# Patient Record
Sex: FEMALE | Race: OTHER
[De-identification: names, ages, dates, MRNs, and addresses within clinical notes are randomized per-mention and may not be internally consistent; named-entity substitution may affect disease eponyms.]

---

## 2018-02-17 NOTE — NUR
ICU RN - REC'D PT. ON DIPRIVAN GTT. AT 60 MCG/KG/MIN. SEDATION VACATION DONE W/CHANGING OF 
BOTTLES. PT. STARTS TO PULL ON BILAT.SOFT WRIST RESTRAINTS & BITING ETT. VENT SETTINGS AT AC 
18,TV-500-40%-PEEP-5. PT.HAS HAD HIGH RESIDUALS UPON START OF SHIFT VIA RT.NARE NGT. AT 
20:34=[phone] & AT 22:23=[phone] CC. GASTRIC CONTENTS RESEMBLE NULYTELY THAT WAS ADM. ON PRIOR 
SHIFT. UPON ENDORSEMENT, I WAS TO FINISH NULYTELY X 3L. RN ATTEMPTED TO GIVE NULYTELY, BUT 
UPON DISCUSSION W/CHARGE RN-HELD UNTIL TALK W/MD. PT.HAS RECTAL TUBE THAT IS EXPELLING WELL. 
DENT CATH TO GRAVITY HAS GOOD UOP. COOLING MEASURES STARTED-100.0 ORAL TEMP AT 20:00. ALL 
PULSES STRONG & PALPABLE X 4 EXT. OBESE. LUE MIDLINE HAS 2 PORTS INFUSING 0.9%NS AT TKO W/ 
ABX'S & DIPRIVAN GTT. VSS. CONT.POC.

-------------------------------------------------------------------------------

Addendum: 02/18/18 at 0335 by JOHN CARLIN RN

-------------------------------------------------------------------------------

PLEASE DISREGARD ABOVE NOTE WILL REASSESS.

## 2018-02-17 NOTE — NUR
LABS FROM 1635 INCLUDING LACTIC ACID 2.5 AND SERUM POTASSIUM 5.2 R/V'D W DR RUGGIERO AND DOM NAGY.  MD AWARE THAT WE WERE ABLE TO PLAVE OROGASTRIC TUBE. ANTIBIOTICS READJUSTED BY ID. 
 FAMILY IN TO VISIT

## 2018-02-17 NOTE — NUR
ICU RN NOTE



1410: Admitted 82 y/o female patient from ER for CHF. with ETT to vent, tolerated settings, 
no respiratory distress noted at this time. On Diprivan, sedated at 40mcg. MORA PICC intact. 
On Levo @ 4mcg. SBP >90 now, will titrate as ordered. Cui cath intact, noted with clear 
yellow urine drained to BSD. Skin assessment done, noted with BUE discolorations, sacral 
decub, both heels redness, left underbreast redness, pictures taken and attached to chart. 
Noted with BLE edema +3, elevated with pillows. 



1430: Family at bedside, Dr. Mark in the unit spoke to family and made aware re: the POC.

## 2018-02-17 NOTE — NUR
ABBY LAM AND BAHMAN IN TO R/V.  SEE NEW ORDERS.  WILL GIVE 2.6 LITER OF NS AND GET ID 
CONSULT. ALL MEDS R/V'D WITH MD'S AND AMENDED

## 2018-02-17 NOTE — NUR
****VERBAL ORDER FROM DR. SOL FOR ETOMIDATE 20MG TO L HAND G 20 FOR 
RSI*****

****VERBAL ORDER FROM DR. SOL FOR SUCCS 180 MG MG TO L HAND G 20 FOR 
RSI*****

PT INTUBATED BY DR SOL USING 7.5 ET TUBE WITH 23CM LIP LINE 

POSITIVE CO2 COLOR CHANGE NOTED WITH BILATERAL BREATH SOUNDS

## 2018-02-17 NOTE — NUR
AAOX1, BIB [facility] FOR SOB X 2 HOURS O2 80% ON 2LNC  IN FIELD. RR IS 
SLIGHTLY LABORED WITH NAD NOTED. SKIN IS WARM AND DRY. PLACED ON THE MONITOR. 
WILL CONTINUOUSLY MONITOR THE PATIENT. AWAITING MD FOR EVAL.

## 2018-02-17 NOTE — NUR
ICU RN - REC'D PT. W/DIPRIVAN GTT. AT 30 MCG/KG/MIN. PT. AWAKENS EASILY TO SEDATION 
VACATION. BNP JUST DRAWN VIA RUE PICC LINE. ALL PORTS ARE PATENT TO FLUSH. REC'D PT. ON 
LEVOPHED GTT. AT 2 MCG/MIN. PT.HAS OGT/CLAMPED. GOOD PLACEMENT AUSC. RT.WRIST IV REMOVED. 
NOT FLUSHING WELL. PT.IS INTUBATED W/VENT SETTINGS AT AC-18,TV-450, 40% & PEEP5. HEART 
MONITOR SHOWS PT.IS AV PACING INTERMITTENTLY W/V-PACING. CONT. POC.

## 2018-02-17 NOTE — NUR
82 YO  FEMALE ADMITTED VIA GURNEY FROM ER TO . DX HYPERCARBIC REPIR FAILURE 
WITH SHOCK .ON VOL VENT VIA ETT. WILL GET ABG. HYPERKALEMIA-WILL RX PER MDS AND OBTAIN 
REPEAT SERUM K.   ETIOLOGY OF SHOCK UNCLEAR. ON LOW DOSE LEVOPHED AND ALSO DIPRIVAN GTT.  DR LAM TO CONSULT AND DR RUGGIERO TO SEE.

## 2018-02-17 NOTE — NUR
D/W DRS KHAMAG AND CAROLE-NO NGT ABLE TO BE PLACED IN ER DUE TO EPISTAXIS THEREFORE UNABLE TO 
GIVE KAYEXALATE OR ANY OTHER PO MEDS

## 2018-02-18 NOTE — NUR
received pt from day shift, sedated on Diprivan at 30mcg, V pacing, intubated, on the vent, 
lungs congested, no edema, OG clamped, f/c OK output, restraints on, v/s stable, no pain, pt 
turned and repositioned.

## 2018-02-18 NOTE — NUR
ICU RN - AM BS=#[id number]-PT.COVERED W/12 U/SQ. AM LABS DRAWN FROM PICC LINE & SENT AT 6AM. 
LEVOPHED GTT. OFF AT 4AM. REPORT ENDORSED TO REI-CHARGE RN. COMPLETE BEDBATH ADM. W/2 RN 
ASSIST. CONT.POC.

## 2018-02-18 NOTE — NUR
ICU RN - K+=5.3 AT 22:00 PM. TROPONIN AT 01:00AM = 0.017. BS AT MN = #295. PT. COVERED VIA 
S/S. DENT CATH TO GRAVITY-GOOD UOP. PT.HAS BILAT. SOFT WRIST RESTRAINTS PER SAFETY 
PROTOCOL. K1GF-VLGRSZIQ AT 100CC/HR, LEVO AT 2 MCG/MIN. DIPRIVAN REMAINS AT 30MCG/KG/MIN. 
CONT.POC.

## 2018-02-18 NOTE — NUR
LABS R/V'S WITHOUT REPOTABLE VALUES.  WILL D/W MDS CBC/HEPARIN DOSING. POTASSIUM NOW WNL. 
OFF OF PRESSORS.

## 2018-02-18 NOTE — NUR
ONGOING FAMILY LIAISON.  VSS, AFEBRILE. URINE OUTPUT APPROX 100 MLS/HR.  FSBS BETTER CONTROL 
ON Q 4 H COVERAGE

## 2018-02-18 NOTE — NUR
DECREASED FIO2 FROM 40% TO 30% DUE TO PAO2 151 / SPO2 100%

-------------------------------------------------------------------------------

Addendum: 02/18/18 at 0914 by HUY TIMMONS RT

-------------------------------------------------------------------------------

Amended: Links added.

## 2018-02-18 NOTE — NUR
REMOTELY R/V'D BY DR RUGGIERO-FOR MAGNESIUM REPLACEMENT AND CHANGE IN GLUCOSE COVERAGE AND IVF 
TO NS

## 2018-02-18 NOTE — NUR
FAMILY IN. SEDATION DOWN TO DIPRIVAN 10 MCG/KG/MIN AND PT FULLY AWAKE AND MOVES ALL FOUR 
EXTREM TO COMMAND.  MOUTHS WORDS.  COUGHING AND TRYING TO SELF EXTUBATE. FAMILY REASSURED PT 
AND AGREES WITH PLAN TO RE SEDATE AS NO WEAN PLAN FOR TODAY

## 2018-02-18 NOTE — NUR
PT. RECEIVED ON VENT SUPPORT VIA ET TUBE WITH PARAMETERS BELLOW AS ORDER:



AC 18

 ML

FIO2 40%

PEEP +5

BREATH SOUNDS CLEAR BILATERAL.

VENT PLUGGED INTO REDOUTLET WITH ALARMS ON AND FUNCTIONING.

JULIO@ HOB

-------------------------------------------------------------------------------

Addendum: 02/18/18 at 0755 by HUY TIMMONS RT

-------------------------------------------------------------------------------

Amended: Links added.

## 2018-02-19 NOTE — NUR
WOUND CARE CONSULT: PT NOT TURNED FOR SKIN ASSESSMENT AT THIS TIME DUE TO WEANING TRIAL IN 
PROGRESS. PT ON FIRST STEP MATTRESS. ALL SKIN PROTECTION MEASURES IN PLACE.  WILL SEE PT AS 
PT CONDITION PERMITS. MD IN AGREEMENT WITH PLAN OF CARE.

## 2018-02-19 NOTE — NUR
pt is resting in the bed, no acute distress overnight, sedated on Diprivan at 30mcg, v/s 
stable, no pain, good urine output, pt cleaned, changed and repositioned q2hrs.

## 2018-02-19 NOTE — NUR
ICU RN NOTES

PATIENT AWAKE , ABLE TO FOLLOW SIMPLE COMMANDS WHILE OFF SEDATION , PATIENT PLACED BACK TO 
AC MODE DUE TO DISTRESS , DIPRIVAN RESTARTED @ 5MCG/KG/MIN , WILL CONTINUE TO MONITOR

## 2018-02-19 NOTE — NUR
WOUND CARE CONSULT: PT PRESENTS WITH FRAGILE SCAR TO SACRAL AREA AND LEFT BREAST FOLD 
REDNESS, PRESENT ON ADMISSION. RECOMMENDATIONS MADE FOR SKIN PROTECTION. DISCUSSED WITH 
NURSING STAFF. PT ON FIRST STEP MATTRESS. WILL SEE PRN. MD IN AGREEMENT WITH PLAN OF CARE. 

-------------------------------------------------------------------------------

Addendum: 02/19/18 at 1237 by ESTRELLITA VALERA WNDNU

-------------------------------------------------------------------------------

Amended: Links added.

## 2018-02-19 NOTE — NUR
WEANING TRIAL AS ORDER:



PT. IS AWAKE AND FOLLOW COMMANDS.

PT CAN LEFT HER HEAD WITHOUT ASSISTANT.



SIMV 4

PS 12

FIO2 30%

PEEP 5



SPO2 98%

HR 80

RR 24- 26

-------------------------------------------------------------------------------

Addendum: 02/19/18 at 0848 by HUY TIMMONS RT

-------------------------------------------------------------------------------

Amended: Links added.

## 2018-02-19 NOTE — NUR
ICU RN NOTES

SEEN AND EVALUATED BY DR LAM , DISCUSSED PATIENT IS OFF SEDATION @ 0800 , ABLE TO FOLLOW 
SIMPLE COMMANDS , AWAKE , TOLERATING SIMV MODE , AFEBRILE ,V/S STABLE , NO S/S OF DISTRESS , 
PER MD TO ABG @ 1000 , WILL RE ASSESS PATIENT IN AFTERNOON FOR POSSIBLE EXTUBATION

## 2018-02-19 NOTE — NUR
ICU RN NOTES

PATIENT OFF SEDATION , OPENS EYES , ABLE TO FOLLOW SIMPLE COMMANDS , TOLERATING CURRENT VENT 
SETTINGS WITH SPO2 % , BILATERAL SOFT WRIST RESTRAINS IN PLACE , WILL CONTINUE TO 
MONITOR , RT AT BEDSIDE FOR WEANING TRIAL AS ORDERED

## 2018-02-19 NOTE — NUR
ICU RN NOTES

SPOKE WITH AMELIA ALEJO , DISCUSSED PATIENT IS NPO / NO FEEDING , RECEIVED AN ORDER TO DC IVF , 
PT IS DIABETIC , PER MD START PF ON D5 1/2 NS @ 30ML/HR , ORDERS CARRIED OUT

## 2018-02-19 NOTE — NUR
received pt from day shift, sedates on Diprivan at 30mcg, V pacing, on the vent, lungs 
congested, some non pitting edema, OG clamped, f/c good output, restraints on, v/s stable, 
no pain, pt turned and repositioned.

## 2018-02-19 NOTE — NUR
ICU RN NOTES

RECEIVED PATIENT SEDATED , RESPONSIVE TO PAIN STIMULI , NOT IN ACUTE DISTRESS , RESPIRATIONS 
EVEN AND UNLABORED WITH SPO2 % VIA MECHANICAL VENT SETTINGS AS ORDERED , V PACING 70 
ON BEDSIDE MONITOR , OGT CLAMPED , FC DRAINING VIA GRAVITY , MORA PICC LINE WITH DIPRIVAN @ 
30MCG/KG/MIN , NS @ 75ML/HR INFUSING WELL , ALL NEEDS ATTENDED ,BED ON LOW AND LOCKED 
POSITION , SIDE RAILS X2 , HON @ 35 , WILL CONTINUE TO MONITOR

## 2018-02-19 NOTE — NUR
BACK TO AC 18, 450, 30%, PEEP 5 DUE TO 45 TO 49 RR

-------------------------------------------------------------------------------

Addendum: 02/19/18 at 1203 by HUY TIMMONS RT

-------------------------------------------------------------------------------

Amended: Links added.

## 2018-02-19 NOTE — NUR
pt is resting in the bed, sedated on Diprivan at 30mcg, v/s stable, no pain, pt turned and 
repositioned q2hrs.

## 2018-02-20 NOTE — NUR
ICU RN NOTES

SEEN AND EVALUATED BY DR ALEJO , DISCUSSED PATIENT V/S , CHEST XRAY , LABS , AFEBRILE , WITH 
GOOD URINE OUTPUT , VERIFIED LASIX 40MG BID ORDER AS BUN 38 AND CREATININE 1.5 , PER MD OK 
TO GIVE  , REPLACED POTASSIUM WITH 40 MEQ KCL IV , AND ORDER STAT MAGNESIUM AND PHOSPHORUS 
LEVEL TODAY , ORDERS CARRIED OUT 

-------------------------------------------------------------------------------

Addendum: 02/20/18 at 1021 by TYRELL ALVARADO RN

-------------------------------------------------------------------------------

NOTIFIED MAGNESIUM LEVEL OF 1.7 , PER MD ORDER 2GM OF MAGNESIUM REPLACEMENT , ORDERS CARRIED 
OUT

## 2018-02-20 NOTE — NUR
RT NOTE:

PATIENT RECEIVED ORALLY INTUBATED WITH 7.5 ETT TAPED AT 23 CM MID LIP LINE. ETT MOVED FROM 
LEFT TO RIGHT OF MOUTH VIA ANCHOR FAST. BILATERAL B/S NOTED.

@1203-PATIENT PLACED ON CPAP PER ORDER.

@1452- PATIENT PLACED BACK ON AC PER DR LAM DUE TO DISTRESS. 

SUCTIONED AND LAVAGED MODERATE-LARGE AMOUNT OF THICK TAN SECRETIONS. VENT PLUGGED INTO RED 
OUTLET. AMBU BAG AT [facility].

## 2018-02-20 NOTE — NUR
ICU RN NOTES

RECEIVED PATIENT SEDATED , RESPONSIVE TO PAIN STIMULI , NOT IN ACUTE DISTRESS , RESPIRATIONS 
EVEN AND UNLABORED WITH SPO2 % VIA MECHANICAL VENT SETTINGS AS ORDERED , V PACING 80 
ON BEDSIDE MONITOR , OGT CLAMPED , FC DRAINING VIA GRAVITY WITH CLEAT YELLOW URINE , MORA 
PICC LINE WITH DIPRIVAN @ 30MCG/KG/MIN , D5 1/2 NS  @ 30ML/HR INFUSING WELL , BILATERAL SOFT 
WRIST RESTRAINTS IN PLACE  , ALL NEEDS ATTENDED ,BED ON LOW AND LOCKED POSITION , SIDE RAILS 
X2 , HOB @ 35 , WILL CONTINUE TO MONITOR

## 2018-02-20 NOTE — NUR
ICU RN NOTES

NOTIFIED DR CASTELLANOS REGARDING ABDOMINAL DISCOMFORT OF THE PATIENT , ABDOMEN LOOKS GASSY 
UPON PERCUSSION  , T.O ORDER RECEIVED .

## 2018-02-20 NOTE — NUR
pt is resting in the bed, no acute distress overnight, sedated, v/s stable, no pain, good 
urine output, pt cleaned, changed and repositioned q2hrs.

## 2018-02-20 NOTE — NUR
ICU RN NOTES

ABG RESULT RELAYED TO DR LAM VIA CPAP MODE  , PATIENT NOTED WITH TACHYPNEA RR OF 35-40 , 
SEEN BY DR LAM , PER MD PLACE BACK PT TO AC MODE , RT PIOTR AT BESIDE PLACED PT ON AC 18 , 
 FIO2 30% PEEP OF 5 SPO2 % , DIPRIVAN RESTARTED , WILL CONTINUE TO MONITOR

## 2018-02-20 NOTE — NUR
ICU RN NOTES

SEEN AND EVALUATED BY DR LAM DISCUSSED LABS , CHEST XRAY , CURRENT V/S, PATIENT IS AWAKE , 
ABLE TO FOLLOW SIMPLE COMMANDS, OFF SEDATION , TOLERATING CURRENT VENT SETTINGS , PER MD 
CHANGE VENT SETTINGS TO CPAP MODE AS ORDERED , ABG  @ 1400 , ORDERS CARRIED OUT

## 2018-02-21 NOTE — NUR
received pt from day shift, sedated on Diprivan at 30mcg, SR, on the vent, lung congested, 
some BL non pitting knee edema, OG to feeding tolerates OK, f/c good output, restraints on, 
v/s stable, no pain, pt turned and repositioned.

## 2018-02-21 NOTE — NUR
ICU/RN - Notes



Diprivan titrated down to 10mcg/kg/min, pt fully awake and moves all four extremities to 
command. Mouths words. Coughing and attempting to self extubate. Pt placed back on sedation 
as there is no wean plan for today.

## 2018-02-22 NOTE — NUR
ICU/RN - Notes



Pt tolerated weaning, extubated by RT per Dr Alves's orders. No s/s of respiratory distress.

## 2018-02-22 NOTE — NUR
pt is resting in the bed, no acute distress overnight, sedated on Diprivan at 30mcg, V 
pacing, good urine output, tolerates feeding, v/s stable, no pain, pt cleaned, changed and 
repositioned q2hrs.

## 2018-02-22 NOTE — NUR
pt is resting in the bed, sedated on Diprivan at 30mcg, tolerates feeding, good urine 
output, v/s stable, no pain, pt turned and repositioned q2hrs.

## 2018-02-22 NOTE — NUR
ICU/RN - Notes



Swallowing assessed at bedside. Pt able to eat ice chips and apple sauce with no s/s of 
aspiration noted. MOM given for pt's complaint of gas and abdominal discomfort.

## 2018-02-22 NOTE — NUR
ICU/RN - Notes



Diprivan gtt turned off, pt fully awake and alert. Follows commands and moves extremities 
purposefully. RT placed pt on SIMV mode. Will continue to monitor.

## 2018-02-22 NOTE — NUR
received pt from day shift, s/p extubation, a/o x4, follows commands, V pacing, on 4L 02 sat 
well, tolerates diet, f/c good output, v/s stable, no pain, pt turned and repositioned.

## 2018-02-22 NOTE — NUR
RT

pt orally intubated remains on [facility] vent on ordered settings. no resp distress noted. no 
weaning at this time. sx prn

-------------------------------------------------------------------------------

Addendum: 02/22/18 at 0351 by HI RICARDO RT

-------------------------------------------------------------------------------

Amended: Links added.

## 2018-02-22 NOTE — NUR
ICU/RN - Initial Notes



Received pt in bed sedated. Orally intubated to mechanical ventilator with settings as 
ordered. No s/s of respiratory distress. Tolerating tube feeding well. Cui catheter intact 
draining urine to gravity. IVF infusing well. Safety and comfort measures in place. Pt for 
mechanical ventilator weaning today. Will continue to monitor pt closely.

## 2018-02-23 NOTE — NUR
RN ICU

TRANSFERRED THE PATIENT TO [facility] LEVEL OF CARE ON STABLE CONDITIONS. ACLS PROTOCOL.

## 2018-02-23 NOTE — NUR
TELE RN NOTES



 NO ACUTE CHANGES NOTED DURING THE SHIFT. PROVIDED COMFORT AND SAFETY. DUE MEDS GIVEN. WILL 
ENDORSE TO THE PM NURSE FOR BRIDGETT.

## 2018-02-23 NOTE — NUR
RN ICU

RECEIVED PATIENT FROM THE PREVIOUS SHIFT. PATIENT IS IN BED. RESTING COMFORTABLY. NO ACUTE 
DISTRESS. ABLE TO VERBALIZE NEEDS. STABLE VITAL SINGS. AFEBRILE. DENT DRAINING URINE TO 
GRAVITY. WILL CONTINUE TO MONITOR AND PROVIDE CARE.

## 2018-02-23 NOTE — NUR
pt is resting in the bed, alert, follows commands, v/s stable, no pain, pt turns and 
repositions by herself.

## 2018-02-24 NOTE — NUR
RN CLOSING NOTE 

PATIENT REMAINS STABLE THROUGHOUT THE DAY, NO SOB NOTED , PATIENT TURNED AND REPOSITIONED 
ALL NEEDS ATTENDED , PATIENT MEDICATION GIVEN PRN. PLAN OF CARE DISCUSSED WITH FAMILY. 
CONTINUATION OF CARE WILL BE ENDORSED TO ONCOMING SHIFT. DISCHARGE PLANNING SCHEDULED FOR 
THE AM

## 2018-02-24 NOTE — NUR
RN INITIAL TEL NOTE

RECEIVED PATIENT FROM THE PREVIOUS SHIFT. PATIENT IS IN BED. RESTING COMFORTABLY. NO ACUTE 
DISTRESS. ABLE TO VERBALIZE NEEDS. STABLE VITAL SINGS. AFEBRILE. JAILENE DRAINING URINE TO 
GRAVITY. WILL CONTINUE TO MONITOR AND PROVIDE CARE. 

-------------------------------------------------------------------------------

Addendum: 02/24/18 at 0614 by MAX CEJA RN

-------------------------------------------------------------------------------

2000

## 2018-02-24 NOTE — NUR
RN INITIAL TEL NOTE

RECEIVED PATIENT FROM THE PREVIOUS SHIFT. PATIENT IS IN BED. RESTING COMFORTABLY. NO ACUTE 
DISTRESS. ABLE TO VERBALIZE NEEDS. STABLE VITAL SINGS. AFEBRILE. DENT DRAINING URINE TO 
GRAVITY. WILL CONTINUE TO MONITOR AND PROVIDE CARE.

## 2018-02-24 NOTE — NUR
RN TEL CLOSING NOTE

ENDORSED PATIENT FROM THE PREVIOUS SHIFT. PATIENT IS IN BED. RESTING COMFORTABLY. NO ACUTE 
DISTRESS. ABLE TO VERBALIZE NEEDS. STABLE VITAL SINGS. AFEBRILE. DENT DRAINING URINE TO 
GRAVITY. WILL CONTINUE TO MONITOR AND PROVIDE CARE.

## 2018-02-24 NOTE — NUR
TELE RN INITIAL NOTE 

PATIENT IN BED RESTING , NO SOB NOTED OR COMPLAINTS OF PAIN PATIENT APPEARS CALM , CALL 
LIGHT WITHIN REACH , SAFETY MEASURES IN PLACE RN WILL CONTINUE TO ASSESS THROUGHOUT THE DAY 
ISOLATION OBSERVED AND MONITORED.

## 2018-02-25 NOTE — NUR
RN MS CLOSING TEL NOTE

ENDORSED PATIENT FROM THE PREVIOUS SHIFT. PATIENT IS IN BED. RESTING COMFORTABLY. NO ACUTE 
DISTRESS. ABLE TO VERBALIZE NEEDS. STABLE VITAL SINGS. AFEBRILE. DENT DRAINING URINE TO 
GRAVITY. WILL CONTINUE TO MONITOR AND PROVIDE CARE

## 2018-02-25 NOTE — NUR
DISCHARGED MS RN NOTES

PATIENT DISCHARGED IN STABLE CONDITION. IN NO APPARENT DISTRESS. VITAL SIGNS ARE STABLE. ALL 
NEEDS WERE MET. SON NOTIFIED. PATIENT IS ESCORTED OUT OF THE HOSPITAL VIA AMBULANCE.

## 2018-02-25 NOTE — NUR
MS RN OPENING NOTES

RECEIVED PATIENT IN STABLE CONDITION. IN NO APPARENT DISTRESS. BEDSIDE RAILS ARE UPX2. BED 
IS LOCKED AND LOWERED. CALL LIGHT IS WITHIN REACH. WILL CONTINUE TO MONITOR.

## 2018-04-15 ENCOUNTER — HOSPITAL ENCOUNTER (INPATIENT)
Dept: HOSPITAL 54 - ER | Age: 83
LOS: 8 days | Discharge: NURSING, REHAB OR LONG-TERM CARE | DRG: 133 | End: 2018-04-23
Attending: INTERNAL MEDICINE | Admitting: INTERNAL MEDICINE
Payer: COMMERCIAL

## 2018-04-15 VITALS — SYSTOLIC BLOOD PRESSURE: 131 MMHG | DIASTOLIC BLOOD PRESSURE: 57 MMHG

## 2018-04-15 VITALS — WEIGHT: 172 LBS | BODY MASS INDEX: 29.37 KG/M2 | HEIGHT: 64 IN

## 2018-04-15 DIAGNOSIS — N18.3: ICD-10-CM

## 2018-04-15 DIAGNOSIS — E87.1: ICD-10-CM

## 2018-04-15 DIAGNOSIS — D64.9: ICD-10-CM

## 2018-04-15 DIAGNOSIS — Z79.84: ICD-10-CM

## 2018-04-15 DIAGNOSIS — L89.159: ICD-10-CM

## 2018-04-15 DIAGNOSIS — E87.3: ICD-10-CM

## 2018-04-15 DIAGNOSIS — M81.0: ICD-10-CM

## 2018-04-15 DIAGNOSIS — E11.65: ICD-10-CM

## 2018-04-15 DIAGNOSIS — B96.20: ICD-10-CM

## 2018-04-15 DIAGNOSIS — J96.01: Primary | ICD-10-CM

## 2018-04-15 DIAGNOSIS — Z96.649: ICD-10-CM

## 2018-04-15 DIAGNOSIS — J90: ICD-10-CM

## 2018-04-15 DIAGNOSIS — K64.9: ICD-10-CM

## 2018-04-15 DIAGNOSIS — Z99.81: ICD-10-CM

## 2018-04-15 DIAGNOSIS — L30.4: ICD-10-CM

## 2018-04-15 DIAGNOSIS — I25.10: ICD-10-CM

## 2018-04-15 DIAGNOSIS — E83.42: ICD-10-CM

## 2018-04-15 DIAGNOSIS — F03.90: ICD-10-CM

## 2018-04-15 DIAGNOSIS — I70.0: ICD-10-CM

## 2018-04-15 DIAGNOSIS — E78.5: ICD-10-CM

## 2018-04-15 DIAGNOSIS — Z95.1: ICD-10-CM

## 2018-04-15 DIAGNOSIS — J44.1: ICD-10-CM

## 2018-04-15 DIAGNOSIS — J44.0: ICD-10-CM

## 2018-04-15 DIAGNOSIS — F09: ICD-10-CM

## 2018-04-15 DIAGNOSIS — E66.9: ICD-10-CM

## 2018-04-15 DIAGNOSIS — Z79.899: ICD-10-CM

## 2018-04-15 DIAGNOSIS — J96.02: ICD-10-CM

## 2018-04-15 DIAGNOSIS — E03.9: ICD-10-CM

## 2018-04-15 DIAGNOSIS — I11.0: ICD-10-CM

## 2018-04-15 DIAGNOSIS — L98.9: ICD-10-CM

## 2018-04-15 DIAGNOSIS — F43.21: ICD-10-CM

## 2018-04-15 DIAGNOSIS — Z88.0: ICD-10-CM

## 2018-04-15 DIAGNOSIS — Z79.4: ICD-10-CM

## 2018-04-15 DIAGNOSIS — N17.9: ICD-10-CM

## 2018-04-15 DIAGNOSIS — Z86.14: ICD-10-CM

## 2018-04-15 DIAGNOSIS — I50.33: ICD-10-CM

## 2018-04-15 DIAGNOSIS — I13.0: ICD-10-CM

## 2018-04-15 DIAGNOSIS — E43: ICD-10-CM

## 2018-04-15 DIAGNOSIS — F32.9: ICD-10-CM

## 2018-04-15 DIAGNOSIS — E11.22: ICD-10-CM

## 2018-04-15 DIAGNOSIS — G93.41: ICD-10-CM

## 2018-04-15 DIAGNOSIS — N39.0: ICD-10-CM

## 2018-04-15 LAB
ALBUMIN SERPL BCP-MCNC: 2.6 G/DL (ref 3.4–5)
ALP SERPL-CCNC: 71 U/L (ref 46–116)
ALT SERPL W P-5'-P-CCNC: 13 U/L (ref 12–78)
APTT PPP: 26 SEC (ref 23–34)
AST SERPL W P-5'-P-CCNC: 15 U/L (ref 15–37)
BASOPHILS # BLD AUTO: 0.3 /CMM (ref 0–0.2)
BASOPHILS NFR BLD AUTO: 2.3 % (ref 0–2)
BILIRUB DIRECT SERPL-MCNC: 0.2 MG/DL (ref 0–0.2)
BILIRUB SERPL-MCNC: 0.4 MG/DL (ref 0.2–1)
BUN SERPL-MCNC: 34 MG/DL (ref 7–18)
CALCIUM SERPL-MCNC: 8.9 MG/DL (ref 8.5–10.1)
CHLORIDE SERPL-SCNC: 97 MMOL/L (ref 98–107)
CO2 SERPL-SCNC: 35 MMOL/L (ref 21–32)
CREAT SERPL-MCNC: 1.3 MG/DL (ref 0.6–1.3)
EOSINOPHIL NFR BLD AUTO: 0.2 % (ref 0–6)
GLUCOSE SERPL-MCNC: 260 MG/DL (ref 74–106)
HCT VFR BLD AUTO: 26 % (ref 33–45)
HGB BLD-MCNC: 8.7 G/DL (ref 11.5–14.8)
INR PPP: 1.26 (ref 0.85–1.15)
LYMPHOCYTES NFR BLD AUTO: 1.1 /CMM (ref 0.8–4.8)
LYMPHOCYTES NFR BLD AUTO: 9.3 % (ref 20–44)
LYMPHOCYTES NFR BLD MANUAL: 10 % (ref 16–48)
MCHC RBC AUTO-ENTMCNC: 33 G/DL (ref 31–36)
MCV RBC AUTO: 87 FL (ref 82–100)
MONOCYTES NFR BLD AUTO: 0.7 /CMM (ref 0.1–1.3)
MONOCYTES NFR BLD AUTO: 6.5 % (ref 2–12)
MONOCYTES NFR BLD MANUAL: 7 % (ref 0–11)
NEUTROPHILS # BLD AUTO: 9.2 /CMM (ref 1.8–8.9)
NEUTROPHILS NFR BLD AUTO: 81.7 % (ref 43–81)
NEUTS BAND NFR BLD MANUAL: 1 % (ref 0–5)
NEUTS SEG NFR BLD MANUAL: 82 % (ref 42–76)
PH UR STRIP: 5 [PH] (ref 5–8)
PLATELET # BLD AUTO: 239 /CMM (ref 150–450)
POTASSIUM SERPL-SCNC: 5.5 MMOL/L (ref 3.5–5.1)
PROT SERPL-MCNC: 7.3 G/DL (ref 6.4–8.2)
RBC # BLD AUTO: 3 MIL/UL (ref 4–5.2)
RBC #/AREA URNS HPF: (no result) /HPF (ref 0–2)
RDW COEFFICIENT OF VARIATION: 17.9 (ref 11.5–15)
SODIUM SERPL-SCNC: 134 MMOL/L (ref 136–145)
TROPONIN I SERPL-MCNC: < 0.017 NG/ML (ref 0–0.06)
UROBILINOGEN UR STRIP-MCNC: 0.2 EU/DL
WBC #/AREA URNS HPF: (no result) /HPF
WBC NRBC COR # BLD AUTO: 11.3 K/UL (ref 4.3–11)

## 2018-04-15 PROCEDURE — A4216 STERILE WATER/SALINE, 10 ML: HCPCS

## 2018-04-15 PROCEDURE — Z7610: HCPCS

## 2018-04-15 PROCEDURE — A4606 OXYGEN PROBE USED W OXIMETER: HCPCS

## 2018-04-15 RX ADMIN — AMLODIPINE BESYLATE SCH MG: 5 TABLET ORAL at 17:00

## 2018-04-15 RX ADMIN — Medication SCH EACH: at 21:53

## 2018-04-15 RX ADMIN — HUMAN INSULIN PRN UNIT: 100 INJECTION, SOLUTION SUBCUTANEOUS at 22:50

## 2018-04-15 RX ADMIN — FUROSEMIDE SCH MG: 10 INJECTION, SOLUTION INTRAMUSCULAR; INTRAVENOUS at 18:51

## 2018-04-15 RX ADMIN — METOPROLOL TARTRATE SCH MG: 25 TABLET, FILM COATED ORAL at 17:00

## 2018-04-15 RX ADMIN — Medication SCH EACH: at 19:20

## 2018-04-15 NOTE — NUR
MS RN NOTES:



BLOOD SUGAR . 3 UNITS OF INSULIN WAS ADMINISTERED. PT HAD APPLE JUICE TO DRINK AFTER. 
OFFERED OTHER SNACKS BUT PT DOES NOT WANT ANY AT THIS TIME TIME. WILL CONTINUE TO MONITOR.

## 2018-04-15 NOTE — NUR
ADMISSION NOTE

RECEIVED PT. PT STABLE AND RESTING IN BED. CHART PROVIDED BY ER. ORDERS RECEIVED. WILL 
CONTINUE TO MONITOR.

## 2018-04-15 NOTE — NUR
RN CLOSING NOTE



PT IN BED RESTING. ALL PT NEEDS ANTICIPATED AND MET. SAFETY MEASURES IN PLACE. CALL LIGHT IN 
REACH. WILL ENDORSE TO NIGHT SHIFT FOR BRIDGETT.

## 2018-04-15 NOTE — NUR
TELE RN OPENING NOTES:



RECEIVED PT IN BED AND IS ASLEEP AT THIS TIME. PT IS AROUSABLE TO TOUCH. PT ON 2LPM VIA NC. 
PT APPEARS TO BE LETHARGIC AT THIS TIME. PT A/OX2. PT HAS IV LEVAQUIN RUNNING AT THIS TIME. 
BED ALARM ACTIVATED. PT IS ON TELE BOX AND READING SHOWS V PACING. CALL LIGHT WITHIN PT'S 
REACH. BED KEPT IN LOW, LOCKED POSITION, AND SIDE RAILS X 2UP. WILL CONTINUE TO MONITOR PT.

## 2018-04-15 NOTE — NUR
BIB PRIVATE EMT, C/O PROGRESSIVE WEAKNESS TODAY, NAD NOTED, VSS, RESP EVEN AND 
UNLABORED, PT PUT ON MONITOR, MD AT BS.

## 2018-04-16 VITALS — SYSTOLIC BLOOD PRESSURE: 141 MMHG | DIASTOLIC BLOOD PRESSURE: 62 MMHG

## 2018-04-16 VITALS — SYSTOLIC BLOOD PRESSURE: 140 MMHG | DIASTOLIC BLOOD PRESSURE: 63 MMHG

## 2018-04-16 VITALS — SYSTOLIC BLOOD PRESSURE: 130 MMHG | DIASTOLIC BLOOD PRESSURE: 59 MMHG

## 2018-04-16 VITALS — SYSTOLIC BLOOD PRESSURE: 112 MMHG | DIASTOLIC BLOOD PRESSURE: 46 MMHG

## 2018-04-16 VITALS — DIASTOLIC BLOOD PRESSURE: 50 MMHG | SYSTOLIC BLOOD PRESSURE: 112 MMHG

## 2018-04-16 VITALS — DIASTOLIC BLOOD PRESSURE: 50 MMHG | SYSTOLIC BLOOD PRESSURE: 120 MMHG

## 2018-04-16 VITALS — DIASTOLIC BLOOD PRESSURE: 41 MMHG | SYSTOLIC BLOOD PRESSURE: 106 MMHG

## 2018-04-16 VITALS — SYSTOLIC BLOOD PRESSURE: 92 MMHG | DIASTOLIC BLOOD PRESSURE: 36 MMHG

## 2018-04-16 VITALS — DIASTOLIC BLOOD PRESSURE: 60 MMHG | SYSTOLIC BLOOD PRESSURE: 120 MMHG

## 2018-04-16 LAB
BASE EXCESS BLDA CALC-SCNC: 8.3 MMOL/L
BASE EXCESS BLDA CALC-SCNC: 8.3 MMOL/L
BASOPHILS # BLD AUTO: 0 /CMM (ref 0–0.2)
BASOPHILS NFR BLD AUTO: 0.2 % (ref 0–2)
BUN SERPL-MCNC: 31 MG/DL (ref 7–18)
CALCIUM SERPL-MCNC: 8.4 MG/DL (ref 8.5–10.1)
CHLORIDE SERPL-SCNC: 101 MMOL/L (ref 98–107)
CO2 SERPL-SCNC: 35 MMOL/L (ref 21–32)
CREAT SERPL-MCNC: 1.2 MG/DL (ref 0.6–1.3)
DO-HGB MFR BLDA: 384.3 MMHG
DO-HGB MFR BLDA: 559.5 MMHG
EOSINOPHIL NFR BLD AUTO: 0.5 % (ref 0–6)
GLUCOSE SERPL-MCNC: 168 MG/DL (ref 74–106)
HCT VFR BLD AUTO: 27 % (ref 33–45)
HGB BLD-MCNC: 8.5 G/DL (ref 11.5–14.8)
INHALED O2 CONCENTRATION: 100 %
INHALED O2 CONCENTRATION: 80 %
INHALED O2 FLOW RATE: 15 L/MIN (ref 0–30)
LYMPHOCYTES NFR BLD AUTO: 1.3 /CMM (ref 0.8–4.8)
LYMPHOCYTES NFR BLD AUTO: 11.9 % (ref 20–44)
MAGNESIUM SERPL-MCNC: 1.6 MG/DL (ref 1.8–2.4)
MCHC RBC AUTO-ENTMCNC: 32 G/DL (ref 31–36)
MCV RBC AUTO: 89 FL (ref 82–100)
MONOCYTES NFR BLD AUTO: 0.8 /CMM (ref 0.1–1.3)
MONOCYTES NFR BLD AUTO: 7.4 % (ref 2–12)
NEUTROPHILS # BLD AUTO: 8.5 /CMM (ref 1.8–8.9)
NEUTROPHILS NFR BLD AUTO: 80 % (ref 43–81)
PCO2 TEMP ADJ BLDA: 81.4 MMHG (ref 35–45)
PCO2 TEMP ADJ BLDA: 86.4 MMHG (ref 35–45)
PH TEMP ADJ BLDA: 7.26 [PH] (ref 7.35–7.45)
PH TEMP ADJ BLDA: 7.27 [PH] (ref 7.35–7.45)
PHOSPHATE SERPL-MCNC: 4 MG/DL (ref 2.5–4.9)
PLATELET # BLD AUTO: 211 /CMM (ref 150–450)
PO2 TEMP ADJ BLDA: 100.6 MMHG (ref 75–100)
PO2 TEMP ADJ BLDA: 67.1 MMHG (ref 75–100)
POTASSIUM SERPL-SCNC: 4.1 MMOL/L (ref 3.5–5.1)
RBC # BLD AUTO: 3.02 MIL/UL (ref 4–5.2)
RDW COEFFICIENT OF VARIATION: 18.8 (ref 11.5–15)
SAO2 % BLDA: 90 % (ref 92–98.5)
SAO2 % BLDA: 96.3 % (ref 92–98.5)
SODIUM SERPL-SCNC: 140 MMOL/L (ref 136–145)
VENTILATION MODE VENT: (no result)
VENTILATION MODE VENT: (no result)
WBC NRBC COR # BLD AUTO: 10.6 K/UL (ref 4.3–11)

## 2018-04-16 PROCEDURE — 0W993ZZ DRAINAGE OF RIGHT PLEURAL CAVITY, PERCUTANEOUS APPROACH: ICD-10-PCS

## 2018-04-16 PROCEDURE — 5A09457 ASSISTANCE WITH RESPIRATORY VENTILATION, 24-96 CONSECUTIVE HOURS, CONTINUOUS POSITIVE AIRWAY PRESSURE: ICD-10-PCS | Performed by: INTERNAL MEDICINE

## 2018-04-16 RX ADMIN — INSULIN HUMAN PRN UNIT: 100 INJECTION, SOLUTION PARENTERAL at 12:43

## 2018-04-16 RX ADMIN — AMLODIPINE BESYLATE SCH MG: 5 TABLET ORAL at 17:55

## 2018-04-16 RX ADMIN — METOPROLOL TARTRATE SCH MG: 25 TABLET, FILM COATED ORAL at 17:55

## 2018-04-16 RX ADMIN — Medication SCH EACH: at 06:01

## 2018-04-16 RX ADMIN — Medication SCH MG: at 08:41

## 2018-04-16 RX ADMIN — FUROSEMIDE SCH MG: 10 INJECTION, SOLUTION INTRAMUSCULAR; INTRAVENOUS at 17:51

## 2018-04-16 RX ADMIN — RALOXIFENE HYDROCHLORIDE SCH MG: 60 TABLET ORAL at 08:41

## 2018-04-16 RX ADMIN — HUMAN INSULIN PRN UNIT: 100 INJECTION, SOLUTION SUBCUTANEOUS at 22:36

## 2018-04-16 RX ADMIN — Medication SCH GM: at 20:30

## 2018-04-16 RX ADMIN — LEVOTHYROXINE SODIUM SCH MCG: 100 TABLET ORAL at 08:40

## 2018-04-16 RX ADMIN — AMLODIPINE BESYLATE SCH MG: 5 TABLET ORAL at 08:42

## 2018-04-16 RX ADMIN — Medication SCH MG: at 08:42

## 2018-04-16 RX ADMIN — INSULIN HUMAN PRN UNIT: 100 INJECTION, SOLUTION PARENTERAL at 06:23

## 2018-04-16 RX ADMIN — Medication SCH TAB: at 08:42

## 2018-04-16 RX ADMIN — INSULIN HUMAN PRN UNIT: 100 INJECTION, SOLUTION PARENTERAL at 17:50

## 2018-04-16 RX ADMIN — Medication SCH EACH: at 17:47

## 2018-04-16 RX ADMIN — MAGNESIUM SULFATE IN DEXTROSE SCH MLS/HR: 10 INJECTION, SOLUTION INTRAVENOUS at 13:34

## 2018-04-16 RX ADMIN — Medication SCH MLS/HR: at 18:45

## 2018-04-16 RX ADMIN — LOSARTAN POTASSIUM SCH MG: 50 TABLET, FILM COATED ORAL at 08:41

## 2018-04-16 RX ADMIN — Medication SCH EACH: at 12:43

## 2018-04-16 RX ADMIN — Medication SCH EACH: at 22:38

## 2018-04-16 RX ADMIN — FOLIC ACID SCH MG: 1 TABLET ORAL at 08:41

## 2018-04-16 RX ADMIN — FUROSEMIDE SCH MG: 10 INJECTION, SOLUTION INTRAMUSCULAR; INTRAVENOUS at 08:37

## 2018-04-16 RX ADMIN — OXYCODONE HYDROCHLORIDE AND ACETAMINOPHEN SCH MG: 500 TABLET ORAL at 08:42

## 2018-04-16 RX ADMIN — MAGNESIUM SULFATE IN DEXTROSE SCH MLS/HR: 10 INJECTION, SOLUTION INTRAVENOUS at 14:38

## 2018-04-16 RX ADMIN — METOPROLOL TARTRATE SCH MG: 25 TABLET, FILM COATED ORAL at 08:41

## 2018-04-16 NOTE — NUR
MS RN NOTES



A/O X2-3, FORGETFUL WITH CONFUSION NOTED, REORIENT EASILY, FOLLOWS SIMPLE COMMAND. OXYGEN AT 
2L VIA NC, NO SOB. DENIES PAIN, SAFETY PRECAUTION MAINTAINED. WILL CONT TO MONITOR.

## 2018-04-16 NOTE — NUR
PT WAS SAT 87% ON SIMPLE MASK 10L. BREATHING TX WAS GIVEN AT THIS TIME. SX PALE YELLOW THICK 
SECRETIONS. PUT PT ON NONREBREATHER AT 15L. PT IS SAT 95% AT THIS TIME.

## 2018-04-16 NOTE — NUR
RN OPENING NOTES



RECEIVED REPORT FROM LISBETH GARCIA. Pt HAD RT SIDED THORACENTESIS EARLIER TODAY. WILL 
HAVE LT SIDED THORACENTESIS TOMORROW. SON & FAMILY VISITING AT BEDSIDE. SON SIGNED CONSENT 
FORM AT BEDSIDE DUE TO Pt's LETHARGY. Pt IS AROUSABLE TO NAME, AND IS VERBAL, BUT IS VERY 
LETHARGIC AND SLEEPY. Pt IS SHOWING S/S OF SOB. ON 4L NC DESATING 82%. RT CALLED FOR 
BREATHING TREATMENT AND DEEP SUCTIONING. INCREASED 02 TO 10L, O2 SAT WENT UP TO 88%. WILL 
INFORM

## 2018-04-16 NOTE — NUR
CALLED SPOKE TO MODESTA MEYER, PATIENT'S SON CONSENTED PROCEDURE FOR THORACENTESIS TODAY, 
VERIFIED WITH NAYAN NUNES. CONSENT FORM PLACE IN THE PATIENT'S CHART.

## 2018-04-16 NOTE — NUR
MS RN CLOSING NOTES



POST THORACENTESIS TODAY WITH 900ML FLUID DRAINAGE OUTPUT LIGHT GREEN COLOR, PATIENT APPEARS 
WEAK AND EPISODE OF NON PRODUCTIVE COUGH, OXYGEN 88-90% ON 2L VIA NC. NOTIFIED DR. CASTELLANOS ORDERED ALBUTEROL VIA NEB Q 4HOUR PRN, INFORMED RT TO ADMINISTER NEB, SPOKE WITH 
GREGORIO AT 1845. BLOOD SUGAR MONITORED WITH ISS PARAMETER GIVEN. MAGNESIUM SUPPLEMENTED 
TODAY. AFEBRILE DURING THE SHIFT. SAFETY PRECAUTION MAINTAINED. ENDORSED TO NIGHT RN.

## 2018-04-16 NOTE — NUR
ICU RN, RECEIVED THE PT FROM  MED/SURG ,TO ICU PT WITH NONREBREATHER  OXYGEN SAT 84.%, BIPAP 
STARTED, SETTINGS 20/5,RATE 16,FIO2 100%.%. IV RT HAND 22G.HOB ELEVATED. AFEBRILE 
WILL CONTINUE TO MONITOR.

## 2018-04-16 NOTE — NUR
MS RN CLOSING NOTES:



ALL NEEDS WERE ATTENDED AND ANTICIPATED FOR. PT ON 2LPM VIA NC AND IS TOLERATING WELL. PT 
REORIENTED MULTIPLE TIMES AS PT REMAINS LETHARGIC AND HAS PERIODS OF FORGETFULNESS. BED 
ALARM ACTIVATED. PT ON TELE BOX AND READING SHOWS V PACING 80. CALL LIGHT WITHIN PT'S REACH. 
BED KEPT IN LOW, LOCKED POSITION, AND SIDE RAILS X 2UP. ENDORSED TO AM NURSE FOR BRIDGETT.

## 2018-04-16 NOTE — NUR
MS RN NOTES:



BLOOD SUGAR THIS AM . 2 UNITS OF INSULIN WAS ADMINISTERED. APPLE SAUCE AND APPLE 
JUICE WERE PROVIDED.

## 2018-04-16 NOTE — NUR
RN NOTES



INFORMED DR RUGGIERO OF Pt's CHANGE IN CONDITION. O2 SAT AT 94% ON MAX CAPACITY O2 ON A 
NON-REBREATHER MASK. WAITING FOR ORDERS.

## 2018-04-16 NOTE — NUR
PT RECEIVED ON NRB. PT PLACED ON BIPAP. SETTINGS AS CHARTED. ALARMS SET AND AUDIBLE. PT 
TOLERATING BIPAP AT THIS TIME

-------------------------------------------------------------------------------

Addendum: 04/16/18 at 2209 by AMIE THOMPSON RT

-------------------------------------------------------------------------------

Amended: Links added.

## 2018-04-16 NOTE — NUR
MS RN NOTES:



SPOKE WITH RONA OVER THE PHONE (DTR) [phone] AND TRIED TO GET TELEPHONE CONSENT FOR 
US GUIDED THORACENTESIS SINCE PT HAS BEEN LETHARGIC THE ENTIRE TIME. SHE WOULD LIKE TO 
CONSENT ONCE THE TIME IS CONFIRMED FOR THE US GUIDED THORACENTESIS AND HAVE NURSE CALL BACK 
TO OBTAIN CONSENT. WILL ENDORSE TO AM NURSE.

## 2018-04-17 VITALS — SYSTOLIC BLOOD PRESSURE: 108 MMHG | DIASTOLIC BLOOD PRESSURE: 47 MMHG

## 2018-04-17 VITALS — DIASTOLIC BLOOD PRESSURE: 40 MMHG | SYSTOLIC BLOOD PRESSURE: 100 MMHG

## 2018-04-17 VITALS — SYSTOLIC BLOOD PRESSURE: 108 MMHG | DIASTOLIC BLOOD PRESSURE: 38 MMHG

## 2018-04-17 VITALS — SYSTOLIC BLOOD PRESSURE: 94 MMHG | DIASTOLIC BLOOD PRESSURE: 40 MMHG

## 2018-04-17 VITALS — SYSTOLIC BLOOD PRESSURE: 112 MMHG | DIASTOLIC BLOOD PRESSURE: 62 MMHG

## 2018-04-17 VITALS — SYSTOLIC BLOOD PRESSURE: 111 MMHG | DIASTOLIC BLOOD PRESSURE: 57 MMHG

## 2018-04-17 VITALS — SYSTOLIC BLOOD PRESSURE: 100 MMHG | DIASTOLIC BLOOD PRESSURE: 57 MMHG

## 2018-04-17 VITALS — DIASTOLIC BLOOD PRESSURE: 50 MMHG | SYSTOLIC BLOOD PRESSURE: 110 MMHG

## 2018-04-17 VITALS — SYSTOLIC BLOOD PRESSURE: 115 MMHG | DIASTOLIC BLOOD PRESSURE: 56 MMHG

## 2018-04-17 VITALS — DIASTOLIC BLOOD PRESSURE: 58 MMHG | SYSTOLIC BLOOD PRESSURE: 107 MMHG

## 2018-04-17 VITALS — DIASTOLIC BLOOD PRESSURE: 69 MMHG | SYSTOLIC BLOOD PRESSURE: 130 MMHG

## 2018-04-17 VITALS — SYSTOLIC BLOOD PRESSURE: 117 MMHG | DIASTOLIC BLOOD PRESSURE: 64 MMHG

## 2018-04-17 VITALS — DIASTOLIC BLOOD PRESSURE: 57 MMHG | SYSTOLIC BLOOD PRESSURE: 111 MMHG

## 2018-04-17 VITALS — SYSTOLIC BLOOD PRESSURE: 100 MMHG | DIASTOLIC BLOOD PRESSURE: 54 MMHG

## 2018-04-17 VITALS — DIASTOLIC BLOOD PRESSURE: 44 MMHG | SYSTOLIC BLOOD PRESSURE: 93 MMHG

## 2018-04-17 VITALS — DIASTOLIC BLOOD PRESSURE: 47 MMHG | SYSTOLIC BLOOD PRESSURE: 103 MMHG

## 2018-04-17 VITALS — DIASTOLIC BLOOD PRESSURE: 45 MMHG | SYSTOLIC BLOOD PRESSURE: 117 MMHG

## 2018-04-17 VITALS — SYSTOLIC BLOOD PRESSURE: 137 MMHG | DIASTOLIC BLOOD PRESSURE: 68 MMHG

## 2018-04-17 VITALS — SYSTOLIC BLOOD PRESSURE: 96 MMHG | DIASTOLIC BLOOD PRESSURE: 32 MMHG

## 2018-04-17 VITALS — DIASTOLIC BLOOD PRESSURE: 48 MMHG | SYSTOLIC BLOOD PRESSURE: 110 MMHG

## 2018-04-17 VITALS — SYSTOLIC BLOOD PRESSURE: 123 MMHG | DIASTOLIC BLOOD PRESSURE: 55 MMHG

## 2018-04-17 VITALS — DIASTOLIC BLOOD PRESSURE: 46 MMHG | SYSTOLIC BLOOD PRESSURE: 97 MMHG

## 2018-04-17 VITALS — DIASTOLIC BLOOD PRESSURE: 32 MMHG | SYSTOLIC BLOOD PRESSURE: 96 MMHG

## 2018-04-17 VITALS — SYSTOLIC BLOOD PRESSURE: 103 MMHG | DIASTOLIC BLOOD PRESSURE: 42 MMHG

## 2018-04-17 VITALS — DIASTOLIC BLOOD PRESSURE: 52 MMHG | SYSTOLIC BLOOD PRESSURE: 127 MMHG

## 2018-04-17 VITALS — SYSTOLIC BLOOD PRESSURE: 92 MMHG | DIASTOLIC BLOOD PRESSURE: 41 MMHG

## 2018-04-17 VITALS — SYSTOLIC BLOOD PRESSURE: 97 MMHG | DIASTOLIC BLOOD PRESSURE: 44 MMHG

## 2018-04-17 VITALS — DIASTOLIC BLOOD PRESSURE: 62 MMHG | SYSTOLIC BLOOD PRESSURE: 112 MMHG

## 2018-04-17 VITALS — SYSTOLIC BLOOD PRESSURE: 108 MMHG | DIASTOLIC BLOOD PRESSURE: 55 MMHG

## 2018-04-17 VITALS — DIASTOLIC BLOOD PRESSURE: 44 MMHG | SYSTOLIC BLOOD PRESSURE: 83 MMHG

## 2018-04-17 VITALS — DIASTOLIC BLOOD PRESSURE: 42 MMHG | SYSTOLIC BLOOD PRESSURE: 114 MMHG

## 2018-04-17 VITALS — SYSTOLIC BLOOD PRESSURE: 76 MMHG | DIASTOLIC BLOOD PRESSURE: 48 MMHG

## 2018-04-17 VITALS — DIASTOLIC BLOOD PRESSURE: 45 MMHG | SYSTOLIC BLOOD PRESSURE: 92 MMHG

## 2018-04-17 VITALS — DIASTOLIC BLOOD PRESSURE: 41 MMHG | SYSTOLIC BLOOD PRESSURE: 86 MMHG

## 2018-04-17 VITALS — DIASTOLIC BLOOD PRESSURE: 38 MMHG | SYSTOLIC BLOOD PRESSURE: 90 MMHG

## 2018-04-17 VITALS — SYSTOLIC BLOOD PRESSURE: 132 MMHG | DIASTOLIC BLOOD PRESSURE: 75 MMHG

## 2018-04-17 VITALS — DIASTOLIC BLOOD PRESSURE: 53 MMHG | SYSTOLIC BLOOD PRESSURE: 120 MMHG

## 2018-04-17 VITALS — DIASTOLIC BLOOD PRESSURE: 61 MMHG | SYSTOLIC BLOOD PRESSURE: 133 MMHG

## 2018-04-17 VITALS — SYSTOLIC BLOOD PRESSURE: 110 MMHG | DIASTOLIC BLOOD PRESSURE: 48 MMHG

## 2018-04-17 VITALS — DIASTOLIC BLOOD PRESSURE: 40 MMHG | SYSTOLIC BLOOD PRESSURE: 91 MMHG

## 2018-04-17 VITALS — SYSTOLIC BLOOD PRESSURE: 139 MMHG | DIASTOLIC BLOOD PRESSURE: 68 MMHG

## 2018-04-17 VITALS — DIASTOLIC BLOOD PRESSURE: 60 MMHG | SYSTOLIC BLOOD PRESSURE: 127 MMHG

## 2018-04-17 VITALS — SYSTOLIC BLOOD PRESSURE: 106 MMHG | DIASTOLIC BLOOD PRESSURE: 54 MMHG

## 2018-04-17 VITALS — SYSTOLIC BLOOD PRESSURE: 102 MMHG | DIASTOLIC BLOOD PRESSURE: 51 MMHG

## 2018-04-17 VITALS — DIASTOLIC BLOOD PRESSURE: 49 MMHG | SYSTOLIC BLOOD PRESSURE: 97 MMHG

## 2018-04-17 VITALS — SYSTOLIC BLOOD PRESSURE: 111 MMHG | DIASTOLIC BLOOD PRESSURE: 58 MMHG

## 2018-04-17 VITALS — SYSTOLIC BLOOD PRESSURE: 106 MMHG | DIASTOLIC BLOOD PRESSURE: 22 MMHG

## 2018-04-17 VITALS — DIASTOLIC BLOOD PRESSURE: 56 MMHG | SYSTOLIC BLOOD PRESSURE: 115 MMHG

## 2018-04-17 VITALS — SYSTOLIC BLOOD PRESSURE: 85 MMHG | DIASTOLIC BLOOD PRESSURE: 35 MMHG

## 2018-04-17 VITALS — SYSTOLIC BLOOD PRESSURE: 110 MMHG

## 2018-04-17 LAB
BASE EXCESS BLDA CALC-SCNC: 9.5 MMOL/L
DO-HGB MFR BLDA: 148.2 MMHG
INHALED O2 CONCENTRATION: 45 %
PCO2 TEMP ADJ BLDA: 60.8 MMHG (ref 35–45)
PH TEMP ADJ BLDA: 7.39 [PH] (ref 7.35–7.45)
PO2 TEMP ADJ BLDA: 103.5 MMHG (ref 75–100)
SAO2 % BLDA: 97 % (ref 92–98.5)
SET RATE, BG: 16
VENTILATION MODE VENT: (no result)

## 2018-04-17 RX ADMIN — Medication SCH EACH: at 09:34

## 2018-04-17 RX ADMIN — ALBUTEROL SULFATE SCH MG: 2.5 SOLUTION RESPIRATORY (INHALATION) at 19:33

## 2018-04-17 RX ADMIN — AMLODIPINE BESYLATE SCH MG: 5 TABLET ORAL at 17:00

## 2018-04-17 RX ADMIN — Medication SCH MG: at 09:34

## 2018-04-17 RX ADMIN — AMLODIPINE BESYLATE SCH MG: 5 TABLET ORAL at 09:41

## 2018-04-17 RX ADMIN — Medication SCH EACH: at 22:57

## 2018-04-17 RX ADMIN — HUMAN INSULIN PRN UNIT: 100 INJECTION, SOLUTION SUBCUTANEOUS at 12:30

## 2018-04-17 RX ADMIN — METOPROLOL TARTRATE SCH MG: 25 TABLET, FILM COATED ORAL at 17:00

## 2018-04-17 RX ADMIN — Medication SCH EACH: at 12:26

## 2018-04-17 RX ADMIN — Medication SCH TAB: at 09:34

## 2018-04-17 RX ADMIN — HUMAN INSULIN PRN UNIT: 100 INJECTION, SOLUTION SUBCUTANEOUS at 18:42

## 2018-04-17 RX ADMIN — ACETAMINOPHEN PRN MG: 325 TABLET ORAL at 10:05

## 2018-04-17 RX ADMIN — Medication PRN EACH: at 23:34

## 2018-04-17 RX ADMIN — Medication SCH EACH: at 18:38

## 2018-04-17 RX ADMIN — ALBUTEROL SULFATE SCH MG: 2.5 SOLUTION RESPIRATORY (INHALATION) at 23:37

## 2018-04-17 RX ADMIN — FOLIC ACID SCH MG: 1 TABLET ORAL at 09:34

## 2018-04-17 RX ADMIN — METOPROLOL TARTRATE SCH MG: 25 TABLET, FILM COATED ORAL at 09:43

## 2018-04-17 RX ADMIN — ALBUTEROL SULFATE SCH MG: 2.5 SOLUTION RESPIRATORY (INHALATION) at 10:52

## 2018-04-17 RX ADMIN — Medication SCH MLS/HR: at 18:15

## 2018-04-17 RX ADMIN — FUROSEMIDE SCH MG: 10 INJECTION, SOLUTION INTRAMUSCULAR; INTRAVENOUS at 18:13

## 2018-04-17 RX ADMIN — Medication SCH GM: at 09:47

## 2018-04-17 RX ADMIN — OXYCODONE HYDROCHLORIDE AND ACETAMINOPHEN SCH MG: 500 TABLET ORAL at 09:41

## 2018-04-17 RX ADMIN — RALOXIFENE HYDROCHLORIDE SCH MG: 60 TABLET ORAL at 09:49

## 2018-04-17 RX ADMIN — RALOXIFENE HYDROCHLORIDE SCH MG: 60 TABLET ORAL at 10:04

## 2018-04-17 RX ADMIN — FUROSEMIDE SCH MG: 10 INJECTION, SOLUTION INTRAMUSCULAR; INTRAVENOUS at 09:33

## 2018-04-17 RX ADMIN — LEVOTHYROXINE SODIUM SCH MCG: 100 TABLET ORAL at 09:33

## 2018-04-17 RX ADMIN — ALBUTEROL SULFATE SCH MG: 2.5 SOLUTION RESPIRATORY (INHALATION) at 15:59

## 2018-04-17 RX ADMIN — LOSARTAN POTASSIUM SCH MG: 50 TABLET, FILM COATED ORAL at 09:42

## 2018-04-17 RX ADMIN — Medication SCH GM: at 20:30

## 2018-04-17 RX ADMIN — HUMAN INSULIN PRN UNIT: 100 INJECTION, SOLUTION SUBCUTANEOUS at 22:51

## 2018-04-17 NOTE — NUR
RT NOTE:

ALERT PATIENT RECEIVED ON BIPAP. TOLERATING WELL. MEPILEX CHANGED AND NO REDNESS NOTED AT 
THIS TIME. AMBU BAG AT [facility].

## 2018-04-17 NOTE — NUR
CXR report from 4/17/18 describes tracheotomy tube in place, however to is on BIPAP.   [facility] 
radiology notified.

## 2018-04-17 NOTE — NUR
ICU RN. INITIAL ASSESSMENT. RECEIVED THE PT REST ON THE BED. AWAKE. ALERT, FOLLOW COMMANDS. 
BIPAP ON. SETTINGS 20/5, RATE 16,FIO2 30%. SAT 98%, NO ACUTE DISTRESS NOTED. CARDIAC MONITOR 
SHOWING V PACING. IV RT HAND 22G. SALINE LOCK. HOB ELEVATED. WILL CONTINUE TO MONITOR 
VITALS.

## 2018-04-17 NOTE — NUR
ICU RN. AM CARE, ORAL CARE, BED BATH GIVEN. LINEN CHANGED. REMAINING SAME  BIPAP SETTING 
TOLERATED WELL. SAT 99%. NO ACUTE DISTRESS NOTED. CARDIAC MONITOR SHOWING AV PACING. HOB 
ELEVATED. TURN AND REPOSITION Q2H. PT IS LETHARGIC.WILL CONTINUE TO MONITOR VITALS.

## 2018-04-17 NOTE — NUR
[facility] states no left sided thoracentesis orders.  Dr Alves notified. OK to place order for 
Left-sided thoracentesis.

## 2018-04-18 VITALS — DIASTOLIC BLOOD PRESSURE: 62 MMHG | SYSTOLIC BLOOD PRESSURE: 124 MMHG

## 2018-04-18 VITALS — SYSTOLIC BLOOD PRESSURE: 129 MMHG | DIASTOLIC BLOOD PRESSURE: 62 MMHG

## 2018-04-18 VITALS — SYSTOLIC BLOOD PRESSURE: 125 MMHG | DIASTOLIC BLOOD PRESSURE: 56 MMHG

## 2018-04-18 VITALS — DIASTOLIC BLOOD PRESSURE: 61 MMHG | SYSTOLIC BLOOD PRESSURE: 131 MMHG

## 2018-04-18 VITALS — SYSTOLIC BLOOD PRESSURE: 113 MMHG | DIASTOLIC BLOOD PRESSURE: 51 MMHG

## 2018-04-18 VITALS — DIASTOLIC BLOOD PRESSURE: 61 MMHG | SYSTOLIC BLOOD PRESSURE: 120 MMHG

## 2018-04-18 VITALS — DIASTOLIC BLOOD PRESSURE: 54 MMHG | SYSTOLIC BLOOD PRESSURE: 109 MMHG

## 2018-04-18 VITALS — DIASTOLIC BLOOD PRESSURE: 64 MMHG | SYSTOLIC BLOOD PRESSURE: 125 MMHG

## 2018-04-18 VITALS — DIASTOLIC BLOOD PRESSURE: 48 MMHG | SYSTOLIC BLOOD PRESSURE: 97 MMHG

## 2018-04-18 VITALS — DIASTOLIC BLOOD PRESSURE: 55 MMHG | SYSTOLIC BLOOD PRESSURE: 112 MMHG

## 2018-04-18 VITALS — DIASTOLIC BLOOD PRESSURE: 63 MMHG | SYSTOLIC BLOOD PRESSURE: 123 MMHG

## 2018-04-18 VITALS — DIASTOLIC BLOOD PRESSURE: 57 MMHG | SYSTOLIC BLOOD PRESSURE: 118 MMHG

## 2018-04-18 VITALS — DIASTOLIC BLOOD PRESSURE: 61 MMHG | SYSTOLIC BLOOD PRESSURE: 122 MMHG

## 2018-04-18 VITALS — DIASTOLIC BLOOD PRESSURE: 62 MMHG | SYSTOLIC BLOOD PRESSURE: 129 MMHG

## 2018-04-18 VITALS — SYSTOLIC BLOOD PRESSURE: 114 MMHG | DIASTOLIC BLOOD PRESSURE: 59 MMHG

## 2018-04-18 VITALS — DIASTOLIC BLOOD PRESSURE: 55 MMHG | SYSTOLIC BLOOD PRESSURE: 108 MMHG

## 2018-04-18 VITALS — DIASTOLIC BLOOD PRESSURE: 49 MMHG | SYSTOLIC BLOOD PRESSURE: 114 MMHG

## 2018-04-18 VITALS — DIASTOLIC BLOOD PRESSURE: 62 MMHG | SYSTOLIC BLOOD PRESSURE: 115 MMHG

## 2018-04-18 VITALS — SYSTOLIC BLOOD PRESSURE: 123 MMHG | DIASTOLIC BLOOD PRESSURE: 63 MMHG

## 2018-04-18 VITALS — DIASTOLIC BLOOD PRESSURE: 49 MMHG | SYSTOLIC BLOOD PRESSURE: 103 MMHG

## 2018-04-18 VITALS — SYSTOLIC BLOOD PRESSURE: 127 MMHG | DIASTOLIC BLOOD PRESSURE: 63 MMHG

## 2018-04-18 VITALS — SYSTOLIC BLOOD PRESSURE: 137 MMHG | DIASTOLIC BLOOD PRESSURE: 62 MMHG

## 2018-04-18 VITALS — SYSTOLIC BLOOD PRESSURE: 124 MMHG | DIASTOLIC BLOOD PRESSURE: 54 MMHG

## 2018-04-18 VITALS — SYSTOLIC BLOOD PRESSURE: 117 MMHG | DIASTOLIC BLOOD PRESSURE: 58 MMHG

## 2018-04-18 VITALS — DIASTOLIC BLOOD PRESSURE: 55 MMHG | SYSTOLIC BLOOD PRESSURE: 122 MMHG

## 2018-04-18 VITALS — SYSTOLIC BLOOD PRESSURE: 137 MMHG | DIASTOLIC BLOOD PRESSURE: 66 MMHG

## 2018-04-18 VITALS — SYSTOLIC BLOOD PRESSURE: 116 MMHG | DIASTOLIC BLOOD PRESSURE: 76 MMHG

## 2018-04-18 VITALS — SYSTOLIC BLOOD PRESSURE: 119 MMHG | DIASTOLIC BLOOD PRESSURE: 56 MMHG

## 2018-04-18 VITALS — SYSTOLIC BLOOD PRESSURE: 119 MMHG | DIASTOLIC BLOOD PRESSURE: 55 MMHG

## 2018-04-18 VITALS — DIASTOLIC BLOOD PRESSURE: 65 MMHG | SYSTOLIC BLOOD PRESSURE: 119 MMHG

## 2018-04-18 VITALS — SYSTOLIC BLOOD PRESSURE: 100 MMHG | DIASTOLIC BLOOD PRESSURE: 52 MMHG

## 2018-04-18 VITALS — DIASTOLIC BLOOD PRESSURE: 62 MMHG | SYSTOLIC BLOOD PRESSURE: 96 MMHG

## 2018-04-18 VITALS — DIASTOLIC BLOOD PRESSURE: 52 MMHG | SYSTOLIC BLOOD PRESSURE: 115 MMHG

## 2018-04-18 VITALS — DIASTOLIC BLOOD PRESSURE: 73 MMHG | SYSTOLIC BLOOD PRESSURE: 141 MMHG

## 2018-04-18 VITALS — DIASTOLIC BLOOD PRESSURE: 51 MMHG | SYSTOLIC BLOOD PRESSURE: 120 MMHG

## 2018-04-18 VITALS — DIASTOLIC BLOOD PRESSURE: 61 MMHG | SYSTOLIC BLOOD PRESSURE: 112 MMHG

## 2018-04-18 VITALS — DIASTOLIC BLOOD PRESSURE: 44 MMHG | SYSTOLIC BLOOD PRESSURE: 99 MMHG

## 2018-04-18 LAB
BASE EXCESS BLDA CALC-SCNC: 10.4 MMOL/L
DO-HGB MFR BLDA: 90.2 MMHG
INHALED O2 CONCENTRATION: 32 %
INHALED O2 FLOW RATE: 3 L/MIN (ref 0–30)
PCO2 TEMP ADJ BLDA: 51 MMHG (ref 35–45)
PH TEMP ADJ BLDA: 7.46 [PH] (ref 7.35–7.45)
PO2 TEMP ADJ BLDA: 78.3 MMHG (ref 75–100)
SAO2 % BLDA: 94.2 % (ref 92–98.5)

## 2018-04-18 PROCEDURE — 0W993ZZ DRAINAGE OF RIGHT PLEURAL CAVITY, PERCUTANEOUS APPROACH: ICD-10-PCS

## 2018-04-18 RX ADMIN — LOSARTAN POTASSIUM SCH MG: 50 TABLET, FILM COATED ORAL at 08:27

## 2018-04-18 RX ADMIN — HUMAN INSULIN PRN UNIT: 100 INJECTION, SOLUTION SUBCUTANEOUS at 21:30

## 2018-04-18 RX ADMIN — INSULIN HUMAN PRN UNIT: 100 INJECTION, SOLUTION PARENTERAL at 18:25

## 2018-04-18 RX ADMIN — ALBUTEROL SULFATE SCH MG: 2.5 SOLUTION RESPIRATORY (INHALATION) at 03:50

## 2018-04-18 RX ADMIN — ACETAMINOPHEN PRN MG: 325 TABLET ORAL at 18:58

## 2018-04-18 RX ADMIN — Medication SCH EACH: at 18:28

## 2018-04-18 RX ADMIN — Medication SCH MLS/HR: at 18:04

## 2018-04-18 RX ADMIN — Medication SCH EACH: at 08:16

## 2018-04-18 RX ADMIN — LEVOTHYROXINE SODIUM SCH MCG: 100 TABLET ORAL at 08:27

## 2018-04-18 RX ADMIN — METOPROLOL TARTRATE SCH MG: 25 TABLET, FILM COATED ORAL at 08:28

## 2018-04-18 RX ADMIN — ALBUTEROL SULFATE SCH MG: 2.5 SOLUTION RESPIRATORY (INHALATION) at 07:13

## 2018-04-18 RX ADMIN — AMLODIPINE BESYLATE SCH MG: 5 TABLET ORAL at 08:28

## 2018-04-18 RX ADMIN — ALBUTEROL SULFATE SCH MG: 2.5 SOLUTION RESPIRATORY (INHALATION) at 22:46

## 2018-04-18 RX ADMIN — Medication SCH TAB: at 08:28

## 2018-04-18 RX ADMIN — HUMAN INSULIN PRN UNIT: 100 INJECTION, SOLUTION SUBCUTANEOUS at 12:25

## 2018-04-18 RX ADMIN — AMLODIPINE BESYLATE SCH MG: 5 TABLET ORAL at 18:04

## 2018-04-18 RX ADMIN — FOLIC ACID SCH MG: 1 TABLET ORAL at 08:27

## 2018-04-18 RX ADMIN — Medication SCH MG: at 08:28

## 2018-04-18 RX ADMIN — Medication SCH EACH: at 21:25

## 2018-04-18 RX ADMIN — ALBUTEROL SULFATE SCH MG: 2.5 SOLUTION RESPIRATORY (INHALATION) at 11:08

## 2018-04-18 RX ADMIN — ALBUTEROL SULFATE SCH MG: 2.5 SOLUTION RESPIRATORY (INHALATION) at 15:28

## 2018-04-18 RX ADMIN — Medication SCH EACH: at 12:11

## 2018-04-18 RX ADMIN — Medication SCH GM: at 08:57

## 2018-04-18 RX ADMIN — RALOXIFENE HYDROCHLORIDE SCH MG: 60 TABLET ORAL at 08:38

## 2018-04-18 RX ADMIN — OXYCODONE HYDROCHLORIDE AND ACETAMINOPHEN SCH MG: 500 TABLET ORAL at 08:28

## 2018-04-18 RX ADMIN — ALBUTEROL SULFATE SCH MG: 2.5 SOLUTION RESPIRATORY (INHALATION) at 19:29

## 2018-04-18 RX ADMIN — Medication PRN EACH: at 05:48

## 2018-04-18 RX ADMIN — FUROSEMIDE SCH MG: 10 INJECTION, SOLUTION INTRAMUSCULAR; INTRAVENOUS at 08:29

## 2018-04-18 RX ADMIN — METOPROLOL TARTRATE SCH MG: 25 TABLET, FILM COATED ORAL at 18:03

## 2018-04-18 RX ADMIN — Medication SCH MG: at 08:27

## 2018-04-18 RX ADMIN — Medication SCH GM: at 21:23

## 2018-04-18 RX ADMIN — FUROSEMIDE SCH MG: 10 INJECTION, SOLUTION INTRAMUSCULAR; INTRAVENOUS at 18:04

## 2018-04-18 NOTE — NUR
ICU RN. AM CARE, ORAL CARE, BED BATH GIVEN. LINEN CHANGED. REMAINING SAME BIPAP SETTING 
TOLERATED WELL. SAT 98%. NO ACUTE DISTRESS NOTED, CARDIAC MONITOR SHOWING NSR. IV RT HAD 
SALINE LOCK, HOB ELEVATED. TURN AND REPOSITION Q2H, WILL CONTINUE TO MONITOR VITALS,

## 2018-04-18 NOTE — NUR
RN NOTES



PT REFUSED NOCTURNAL BIPAP  TO RT RISK AND BENEFITS EXPLAINED, PT VERBALIZED UNDERSTANDING  
NUT BIPAP STILL REFUSED. PLACE PT BACK TO O2 2LPM VIA NC TOLERATED WELL SATING 98% NO RESP. 
DISTRESS PRESENT

## 2018-04-18 NOTE — NUR
RN NOTES



RECEIVED PT AWAKE ALERT ORIENTED X 3 ABLE TO VERBALIZED NEEDS AND FEELINGS.  NO ACUTE RESP 
DISTRESS WITH O2 2LPM VIA NC TOLERATED WELL. BILATERAL BREATH SOUND DIMINISHED.  SATING 96%. 
 V-PACING ON TELE MONITOR HR 80. IV SITE ON LEFT WRIST G 22 INTACT AND PATENT.  NO 
SIGNIFICANT BRIDGETT FROM S/P THORACENTESIS. REPOSITIONED AS PT COMFORTABLE. KEPT PT CLEAN AND 
COMFORTABLE IN BED. OFFLOADED EXT WITH PILLOWS WILL CONTINUE TO MONITOR.

## 2018-04-18 NOTE — NUR
RCVD PT ON 2L NC. PLACED PT ON NOCTURNAL BIPAP PER MD'S ORDERED. PT TOLERATED WELL , NO 
RESPIRATORY DISTRESS NOTED. SPO2 100% CHANGED THE MASK TO  MEDIUM SIZE . NO WOUND OR SORE 
AROUND THE FACE. NAYAN CRUZ NOTIFIED.

## 2018-04-18 NOTE — NUR
Thoracentesis Left side, completed by Dr. Aly.  700 ml off. will send to lab for Tot. Prot, 
Glucose, LDH. pt tolerated procedure well, denies pain, denies sob, cough.

## 2018-04-19 VITALS — SYSTOLIC BLOOD PRESSURE: 123 MMHG | DIASTOLIC BLOOD PRESSURE: 55 MMHG

## 2018-04-19 VITALS — SYSTOLIC BLOOD PRESSURE: 136 MMHG | DIASTOLIC BLOOD PRESSURE: 44 MMHG

## 2018-04-19 VITALS — DIASTOLIC BLOOD PRESSURE: 62 MMHG | SYSTOLIC BLOOD PRESSURE: 126 MMHG

## 2018-04-19 VITALS — DIASTOLIC BLOOD PRESSURE: 30 MMHG | SYSTOLIC BLOOD PRESSURE: 127 MMHG

## 2018-04-19 VITALS — DIASTOLIC BLOOD PRESSURE: 63 MMHG | SYSTOLIC BLOOD PRESSURE: 141 MMHG

## 2018-04-19 VITALS — DIASTOLIC BLOOD PRESSURE: 57 MMHG | SYSTOLIC BLOOD PRESSURE: 122 MMHG

## 2018-04-19 VITALS — SYSTOLIC BLOOD PRESSURE: 110 MMHG | DIASTOLIC BLOOD PRESSURE: 52 MMHG

## 2018-04-19 VITALS — DIASTOLIC BLOOD PRESSURE: 51 MMHG | SYSTOLIC BLOOD PRESSURE: 116 MMHG

## 2018-04-19 VITALS — SYSTOLIC BLOOD PRESSURE: 108 MMHG | DIASTOLIC BLOOD PRESSURE: 55 MMHG

## 2018-04-19 VITALS — SYSTOLIC BLOOD PRESSURE: 105 MMHG | DIASTOLIC BLOOD PRESSURE: 55 MMHG

## 2018-04-19 VITALS — SYSTOLIC BLOOD PRESSURE: 105 MMHG | DIASTOLIC BLOOD PRESSURE: 44 MMHG

## 2018-04-19 VITALS — SYSTOLIC BLOOD PRESSURE: 126 MMHG | DIASTOLIC BLOOD PRESSURE: 65 MMHG

## 2018-04-19 VITALS — DIASTOLIC BLOOD PRESSURE: 56 MMHG | SYSTOLIC BLOOD PRESSURE: 114 MMHG

## 2018-04-19 VITALS — DIASTOLIC BLOOD PRESSURE: 46 MMHG | SYSTOLIC BLOOD PRESSURE: 103 MMHG

## 2018-04-19 VITALS — DIASTOLIC BLOOD PRESSURE: 53 MMHG | SYSTOLIC BLOOD PRESSURE: 101 MMHG

## 2018-04-19 VITALS — DIASTOLIC BLOOD PRESSURE: 51 MMHG | SYSTOLIC BLOOD PRESSURE: 115 MMHG

## 2018-04-19 VITALS — SYSTOLIC BLOOD PRESSURE: 110 MMHG | DIASTOLIC BLOOD PRESSURE: 49 MMHG

## 2018-04-19 VITALS — SYSTOLIC BLOOD PRESSURE: 119 MMHG | DIASTOLIC BLOOD PRESSURE: 55 MMHG

## 2018-04-19 VITALS — DIASTOLIC BLOOD PRESSURE: 49 MMHG | SYSTOLIC BLOOD PRESSURE: 109 MMHG

## 2018-04-19 VITALS — SYSTOLIC BLOOD PRESSURE: 103 MMHG | DIASTOLIC BLOOD PRESSURE: 52 MMHG

## 2018-04-19 VITALS — DIASTOLIC BLOOD PRESSURE: 81 MMHG | SYSTOLIC BLOOD PRESSURE: 126 MMHG

## 2018-04-19 LAB
BASOPHILS # BLD AUTO: 0 /CMM (ref 0–0.2)
BASOPHILS NFR BLD AUTO: 0.3 % (ref 0–2)
BUN SERPL-MCNC: 31 MG/DL (ref 7–18)
CALCIUM SERPL-MCNC: 8.1 MG/DL (ref 8.5–10.1)
CHLORIDE SERPL-SCNC: 100 MMOL/L (ref 98–107)
CO2 SERPL-SCNC: 39 MMOL/L (ref 21–32)
CREAT SERPL-MCNC: 1.1 MG/DL (ref 0.6–1.3)
EOSINOPHIL NFR BLD AUTO: 2.5 % (ref 0–6)
GLUCOSE SERPL-MCNC: 91 MG/DL (ref 74–106)
HCT VFR BLD AUTO: 25 % (ref 33–45)
HGB BLD-MCNC: 8.1 G/DL (ref 11.5–14.8)
LYMPHOCYTES NFR BLD AUTO: 1.7 /CMM (ref 0.8–4.8)
LYMPHOCYTES NFR BLD AUTO: 19.2 % (ref 20–44)
MAGNESIUM SERPL-MCNC: 1.4 MG/DL (ref 1.8–2.4)
MCHC RBC AUTO-ENTMCNC: 33 G/DL (ref 31–36)
MCV RBC AUTO: 87 FL (ref 82–100)
MONOCYTES NFR BLD AUTO: 0.8 /CMM (ref 0.1–1.3)
MONOCYTES NFR BLD AUTO: 9.7 % (ref 2–12)
NEUTROPHILS # BLD AUTO: 5.9 /CMM (ref 1.8–8.9)
NEUTROPHILS NFR BLD AUTO: 68.3 % (ref 43–81)
PHOSPHATE SERPL-MCNC: 3 MG/DL (ref 2.5–4.9)
PLATELET # BLD AUTO: 161 /CMM (ref 150–450)
POTASSIUM SERPL-SCNC: 3.1 MMOL/L (ref 3.5–5.1)
RBC # BLD AUTO: 2.87 MIL/UL (ref 4–5.2)
RDW COEFFICIENT OF VARIATION: 19 (ref 11.5–15)
SODIUM SERPL-SCNC: 141 MMOL/L (ref 136–145)
WBC NRBC COR # BLD AUTO: 8.6 K/UL (ref 4.3–11)

## 2018-04-19 RX ADMIN — OXYCODONE HYDROCHLORIDE AND ACETAMINOPHEN SCH MG: 500 TABLET ORAL at 08:34

## 2018-04-19 RX ADMIN — Medication SCH MG: at 08:34

## 2018-04-19 RX ADMIN — Medication SCH TAB: at 08:34

## 2018-04-19 RX ADMIN — ALBUTEROL SULFATE SCH MG: 2.5 SOLUTION RESPIRATORY (INHALATION) at 11:18

## 2018-04-19 RX ADMIN — Medication PRN EACH: at 01:31

## 2018-04-19 RX ADMIN — MAGNESIUM SULFATE IN DEXTROSE SCH MLS/HR: 10 INJECTION, SOLUTION INTRAVENOUS at 11:39

## 2018-04-19 RX ADMIN — FUROSEMIDE SCH MG: 10 INJECTION, SOLUTION INTRAMUSCULAR; INTRAVENOUS at 17:00

## 2018-04-19 RX ADMIN — LOSARTAN POTASSIUM SCH MG: 50 TABLET, FILM COATED ORAL at 08:36

## 2018-04-19 RX ADMIN — Medication SCH EACH: at 07:45

## 2018-04-19 RX ADMIN — INSULIN HUMAN PRN UNIT: 100 INJECTION, SOLUTION PARENTERAL at 12:59

## 2018-04-19 RX ADMIN — ALBUTEROL SULFATE SCH MG: 2.5 SOLUTION RESPIRATORY (INHALATION) at 19:39

## 2018-04-19 RX ADMIN — Medication SCH EACH: at 11:39

## 2018-04-19 RX ADMIN — MAGNESIUM SULFATE IN DEXTROSE SCH MLS/HR: 10 INJECTION, SOLUTION INTRAVENOUS at 10:47

## 2018-04-19 RX ADMIN — ALBUTEROL SULFATE SCH MG: 2.5 SOLUTION RESPIRATORY (INHALATION) at 03:21

## 2018-04-19 RX ADMIN — METOPROLOL TARTRATE SCH MG: 25 TABLET, FILM COATED ORAL at 08:35

## 2018-04-19 RX ADMIN — MAGNESIUM SULFATE IN DEXTROSE SCH MLS/HR: 10 INJECTION, SOLUTION INTRAVENOUS at 14:14

## 2018-04-19 RX ADMIN — MAGNESIUM SULFATE IN DEXTROSE SCH MLS/HR: 10 INJECTION, SOLUTION INTRAVENOUS at 12:56

## 2018-04-19 RX ADMIN — FUROSEMIDE SCH MG: 10 INJECTION, SOLUTION INTRAMUSCULAR; INTRAVENOUS at 08:35

## 2018-04-19 RX ADMIN — AMLODIPINE BESYLATE SCH MG: 5 TABLET ORAL at 17:01

## 2018-04-19 RX ADMIN — LEVOTHYROXINE SODIUM SCH MCG: 100 TABLET ORAL at 07:44

## 2018-04-19 RX ADMIN — ALBUTEROL SULFATE SCH MG: 2.5 SOLUTION RESPIRATORY (INHALATION) at 15:10

## 2018-04-19 RX ADMIN — Medication SCH GM: at 08:36

## 2018-04-19 RX ADMIN — RALOXIFENE HYDROCHLORIDE SCH MG: 60 TABLET ORAL at 08:34

## 2018-04-19 RX ADMIN — AMLODIPINE BESYLATE SCH MG: 5 TABLET ORAL at 08:36

## 2018-04-19 RX ADMIN — Medication SCH EACH: at 17:33

## 2018-04-19 RX ADMIN — Medication SCH GM: at 22:16

## 2018-04-19 RX ADMIN — FOLIC ACID SCH MG: 1 TABLET ORAL at 08:34

## 2018-04-19 RX ADMIN — INSULIN HUMAN PRN UNIT: 100 INJECTION, SOLUTION PARENTERAL at 22:14

## 2018-04-19 RX ADMIN — METOPROLOL TARTRATE SCH MG: 25 TABLET, FILM COATED ORAL at 17:01

## 2018-04-19 RX ADMIN — ALBUTEROL SULFATE SCH MG: 2.5 SOLUTION RESPIRATORY (INHALATION) at 08:23

## 2018-04-19 RX ADMIN — INSULIN HUMAN PRN UNIT: 100 INJECTION, SOLUTION PARENTERAL at 17:35

## 2018-04-19 RX ADMIN — Medication SCH EACH: at 22:16

## 2018-04-19 NOTE — NUR
RN NOTE

PT TRANSFERRED TO TELEMETRY  AT 1730, PT REMAINED STABLE, FAMILY AWARE. WILL ENDORSE 
TO NIGHT SHIFT.

## 2018-04-19 NOTE — NUR
RN NOTES



PT ASLEEP AT THIS TIME. PRN PAIN MEDICINE EFFECTIVE AFTER 1 HOUR, EASILY AROUSABLE. O2 2LPM 
VIA NC TOLERATED WELL  SATING 100% ALL NEEDS ATTENDED. AFEBRILE.  NO SIGNIFICANT BRIDGETT 
THROUGHOUT THE SHIFT. KEPT PT CLEAN AND COMFORTABLE IN BED.  CALL LIGHT KEPT WITHIN EASY 
REACH. WILL ENDORSED CONTINUITY OF CARE TO AM NURSE.

## 2018-04-19 NOTE — NUR
ICU RN NOTES

RECEIVED PATIENT AOX3 , NOT IN ACUTE DISTRESS , DENIES SOB AND DISCOMFORT AT THIS TIME , 
SPO2 OF 95% VIA 2LPM NC , V PACING 80 ON BEDSIDE MONITOR , IV OF L WRIST # 22 PATENT AND 
INTACT SL , ALL NEEDS ATTENDED , BED ON LOW AND LOCKED POSITION , SIDE RAILS X2 CALL LIGHT 
WITHIN REACH , HOB@ 35 , WILL CONTINUE TO MONITOR .

## 2018-04-19 NOTE — NUR
RN TEL INITIAL NOTES

RECEIVED PATIENT AOX3 , NOT IN ACUTE DISTRESS , DENIES SOB AND DISCOMFORT AT THIS TIME , 
SPO2 OF 95% VIA 2LPM NC , V PACING 80  , IV OF L WRIST # 22 PATENT AND INTACT SL , ALL NEEDS 
ATTENDED , BED ON LOW AND LOCKED POSITION , SIDE RAILS X2 CALL LIGHT WITHIN REACH , HOB@ 35 
, WILL CONTINUE TO MONITOR

## 2018-04-19 NOTE — NUR
RT NOTE



PLACED PATIENT ON BIPAP WITH CURRENT SETTINGS OF 15/5, BUR 12, 30%. PATIENT REFUSED BIPAP 
AFTER 10 MINUTES. EXPLAINED RISKS AND BENEFITS. PATIENT STILL REFUSED. NO SOB NOTED. PATIENT 
STABLE ON NASAL CANNULA @ 28%. WILL CONTINUE TO MONITOR.

-------------------------------------------------------------------------------

Addendum: 04/19/18 at 2147 by KENJI BOTAENG RT

-------------------------------------------------------------------------------

MAX SPICER, NOTIFIED AND IS AWARE.

## 2018-04-19 NOTE — NUR
ICU RN NOTES

INSERTED DENT CATHETER AS ORDERED , PT IS GETTING LASIX 40MG AS ORDERED , INCONTINENT , PT 
TOLERATED FC INSERTION , FC DRAINING VIA GRAVITY WITH CLOUDY YELLOW URINE , WILL CONTINUE TO 
MONITOR

## 2018-04-20 VITALS — DIASTOLIC BLOOD PRESSURE: 52 MMHG | SYSTOLIC BLOOD PRESSURE: 101 MMHG

## 2018-04-20 VITALS — SYSTOLIC BLOOD PRESSURE: 111 MMHG | DIASTOLIC BLOOD PRESSURE: 55 MMHG

## 2018-04-20 VITALS — SYSTOLIC BLOOD PRESSURE: 109 MMHG | DIASTOLIC BLOOD PRESSURE: 54 MMHG

## 2018-04-20 VITALS — DIASTOLIC BLOOD PRESSURE: 55 MMHG | SYSTOLIC BLOOD PRESSURE: 111 MMHG

## 2018-04-20 VITALS — SYSTOLIC BLOOD PRESSURE: 142 MMHG | DIASTOLIC BLOOD PRESSURE: 58 MMHG

## 2018-04-20 VITALS — DIASTOLIC BLOOD PRESSURE: 52 MMHG | SYSTOLIC BLOOD PRESSURE: 103 MMHG

## 2018-04-20 LAB
BASOPHILS # BLD AUTO: 0.1 /CMM (ref 0–0.2)
BASOPHILS NFR BLD AUTO: 0.6 % (ref 0–2)
BUN SERPL-MCNC: 26 MG/DL (ref 7–18)
CALCIUM SERPL-MCNC: 8.2 MG/DL (ref 8.5–10.1)
CHLORIDE SERPL-SCNC: 100 MMOL/L (ref 98–107)
CO2 SERPL-SCNC: 43 MMOL/L (ref 21–32)
CREAT SERPL-MCNC: 1 MG/DL (ref 0.6–1.3)
EOSINOPHIL NFR BLD AUTO: 2.4 % (ref 0–6)
GLUCOSE SERPL-MCNC: 68 MG/DL (ref 74–106)
HCT VFR BLD AUTO: 27 % (ref 33–45)
HGB BLD-MCNC: 8.9 G/DL (ref 11.5–14.8)
LYMPHOCYTES NFR BLD AUTO: 1.6 /CMM (ref 0.8–4.8)
LYMPHOCYTES NFR BLD AUTO: 14 % (ref 20–44)
MAGNESIUM SERPL-MCNC: 2.2 MG/DL (ref 1.8–2.4)
MCHC RBC AUTO-ENTMCNC: 33 G/DL (ref 31–36)
MCV RBC AUTO: 87 FL (ref 82–100)
MONOCYTES NFR BLD AUTO: 0.8 /CMM (ref 0.1–1.3)
MONOCYTES NFR BLD AUTO: 7 % (ref 2–12)
NEUTROPHILS # BLD AUTO: 8.5 /CMM (ref 1.8–8.9)
NEUTROPHILS NFR BLD AUTO: 76 % (ref 43–81)
PLATELET # BLD AUTO: 190 /CMM (ref 150–450)
POTASSIUM SERPL-SCNC: 3.6 MMOL/L (ref 3.5–5.1)
RBC # BLD AUTO: 3.13 MIL/UL (ref 4–5.2)
RDW COEFFICIENT OF VARIATION: 19 (ref 11.5–15)
SODIUM SERPL-SCNC: 145 MMOL/L (ref 136–145)
WBC NRBC COR # BLD AUTO: 11.1 K/UL (ref 4.3–11)

## 2018-04-20 RX ADMIN — METOPROLOL TARTRATE SCH MG: 25 TABLET, FILM COATED ORAL at 17:00

## 2018-04-20 RX ADMIN — FOLIC ACID SCH MG: 1 TABLET ORAL at 08:31

## 2018-04-20 RX ADMIN — LEVOTHYROXINE SODIUM SCH MCG: 100 TABLET ORAL at 08:35

## 2018-04-20 RX ADMIN — ALBUTEROL SULFATE SCH MG: 2.5 SOLUTION RESPIRATORY (INHALATION) at 07:19

## 2018-04-20 RX ADMIN — METOPROLOL TARTRATE SCH MG: 25 TABLET, FILM COATED ORAL at 08:30

## 2018-04-20 RX ADMIN — AMLODIPINE BESYLATE SCH MG: 5 TABLET ORAL at 17:00

## 2018-04-20 RX ADMIN — FUROSEMIDE SCH MG: 10 INJECTION, SOLUTION INTRAMUSCULAR; INTRAVENOUS at 17:17

## 2018-04-20 RX ADMIN — AMLODIPINE BESYLATE SCH MG: 5 TABLET ORAL at 08:30

## 2018-04-20 RX ADMIN — FUROSEMIDE SCH MG: 10 INJECTION, SOLUTION INTRAMUSCULAR; INTRAVENOUS at 08:29

## 2018-04-20 RX ADMIN — Medication PRN EACH: at 18:14

## 2018-04-20 RX ADMIN — INSULIN HUMAN PRN UNIT: 100 INJECTION, SOLUTION PARENTERAL at 17:36

## 2018-04-20 RX ADMIN — RALOXIFENE HYDROCHLORIDE SCH MG: 60 TABLET ORAL at 08:30

## 2018-04-20 RX ADMIN — Medication SCH GM: at 21:43

## 2018-04-20 RX ADMIN — LOSARTAN POTASSIUM SCH MG: 50 TABLET, FILM COATED ORAL at 08:31

## 2018-04-20 RX ADMIN — Medication SCH EACH: at 17:17

## 2018-04-20 RX ADMIN — HUMAN INSULIN PRN UNIT: 100 INJECTION, SOLUTION SUBCUTANEOUS at 22:01

## 2018-04-20 RX ADMIN — ALBUTEROL SULFATE SCH MG: 2.5 SOLUTION RESPIRATORY (INHALATION) at 15:49

## 2018-04-20 RX ADMIN — Medication SCH TAB: at 08:30

## 2018-04-20 RX ADMIN — OXYCODONE HYDROCHLORIDE AND ACETAMINOPHEN SCH MG: 500 TABLET ORAL at 08:31

## 2018-04-20 RX ADMIN — Medication SCH EACH: at 21:48

## 2018-04-20 RX ADMIN — Medication PRN EACH: at 05:33

## 2018-04-20 RX ADMIN — ALBUTEROL SULFATE SCH MG: 2.5 SOLUTION RESPIRATORY (INHALATION) at 00:05

## 2018-04-20 RX ADMIN — Medication SCH MG: at 08:30

## 2018-04-20 RX ADMIN — Medication SCH EACH: at 08:31

## 2018-04-20 RX ADMIN — Medication SCH GM: at 08:31

## 2018-04-20 RX ADMIN — ALBUTEROL SULFATE SCH MG: 2.5 SOLUTION RESPIRATORY (INHALATION) at 11:43

## 2018-04-20 RX ADMIN — ALBUTEROL SULFATE SCH MG: 2.5 SOLUTION RESPIRATORY (INHALATION) at 19:17

## 2018-04-20 RX ADMIN — ALBUTEROL SULFATE SCH MG: 2.5 SOLUTION RESPIRATORY (INHALATION) at 03:43

## 2018-04-20 RX ADMIN — INSULIN HUMAN PRN UNIT: 100 INJECTION, SOLUTION PARENTERAL at 12:18

## 2018-04-20 RX ADMIN — Medication SCH EACH: at 12:20

## 2018-04-20 RX ADMIN — ALBUTEROL SULFATE SCH MG: 2.5 SOLUTION RESPIRATORY (INHALATION) at 22:46

## 2018-04-20 NOTE — NUR
RN TEL CLOSING NOTES

ENDORSED PATIENT AOX3 , NOT IN ACUTE DISTRESS , DENIES SOB, C/O SACRAL PS PAIN AFTER ADLS, 
NORCO 5-325MG PO PRN GIVEN , REPOSITIONED Q2H QS, SPO2 OF 95% VIA 2LPM NC , V PACING 80  , 
IV OF L WRIST # 22 PATENT AND INTACT SL , ALL NEEDS ATTENDED , BED ON LOW AND LOCKED 
POSITION , SIDE RAILS X2 CALL LIGHT WITHIN REACH , HOB@ 35 , WILL CONTINUE TO MONITOR

## 2018-04-20 NOTE — NUR
TELERN

HS CARE DONE, NEEDS HELP ON BASIC ADLS.  NYSTATIN POWDER ON HORACIO BREAST FOLDS AND GROIN 
AREAS.  PERIANAL Z KRYSTLE APPLIED, STATING AND MILD DISCOMFORTS, .  DIAPER OFF FOR NOW, DENT 
TO GRAVITY, OUTPUT MONITORED. REPOSITIONED FOR COMFORT. ALL NEEDS ATTENDED.

## 2018-04-20 NOTE — NUR
RN NOTES

RECEIVED PT A&0X3, ON 2L NC SATING WELL NO SOB OR DISTRESS NOTED. V PACING ON THE TELE ELANA 
HR 80. DENT DRAINING TO GRAVITY. LW 22G IV SITE INTACT WITH NO IVF. BED LOCKED AND IN 
LOWEST POSITION, CALL LIGHT WITHIN REACH, SIDE RAILS UPX3, WILL CONT TO ELANA.

## 2018-04-21 VITALS — SYSTOLIC BLOOD PRESSURE: 108 MMHG | DIASTOLIC BLOOD PRESSURE: 54 MMHG

## 2018-04-21 VITALS — SYSTOLIC BLOOD PRESSURE: 103 MMHG | DIASTOLIC BLOOD PRESSURE: 47 MMHG

## 2018-04-21 VITALS — SYSTOLIC BLOOD PRESSURE: 115 MMHG | DIASTOLIC BLOOD PRESSURE: 50 MMHG

## 2018-04-21 VITALS — DIASTOLIC BLOOD PRESSURE: 50 MMHG | SYSTOLIC BLOOD PRESSURE: 104 MMHG

## 2018-04-21 VITALS — SYSTOLIC BLOOD PRESSURE: 110 MMHG | DIASTOLIC BLOOD PRESSURE: 50 MMHG

## 2018-04-21 VITALS — SYSTOLIC BLOOD PRESSURE: 105 MMHG | DIASTOLIC BLOOD PRESSURE: 51 MMHG

## 2018-04-21 LAB
BASOPHILS # BLD AUTO: 0 /CMM (ref 0–0.2)
BASOPHILS NFR BLD AUTO: 0.2 % (ref 0–2)
BUN SERPL-MCNC: 25 MG/DL (ref 7–18)
CALCIUM SERPL-MCNC: 8.1 MG/DL (ref 8.5–10.1)
CHLORIDE SERPL-SCNC: 96 MMOL/L (ref 98–107)
CO2 SERPL-SCNC: 44 MMOL/L (ref 21–32)
CREAT SERPL-MCNC: 1 MG/DL (ref 0.6–1.3)
EOSINOPHIL NFR BLD AUTO: 2.5 % (ref 0–6)
GLUCOSE SERPL-MCNC: 156 MG/DL (ref 74–106)
HCT VFR BLD AUTO: 27 % (ref 33–45)
HGB BLD-MCNC: 8.6 G/DL (ref 11.5–14.8)
LYMPHOCYTES NFR BLD AUTO: 1.8 /CMM (ref 0.8–4.8)
LYMPHOCYTES NFR BLD AUTO: 14.7 % (ref 20–44)
MCHC RBC AUTO-ENTMCNC: 32 G/DL (ref 31–36)
MCV RBC AUTO: 88 FL (ref 82–100)
MONOCYTES NFR BLD AUTO: 0.7 /CMM (ref 0.1–1.3)
MONOCYTES NFR BLD AUTO: 6 % (ref 2–12)
NEUTROPHILS # BLD AUTO: 9.4 /CMM (ref 1.8–8.9)
NEUTROPHILS NFR BLD AUTO: 76.6 % (ref 43–81)
PLATELET # BLD AUTO: 173 /CMM (ref 150–450)
POTASSIUM SERPL-SCNC: 3.9 MMOL/L (ref 3.5–5.1)
RBC # BLD AUTO: 3.07 MIL/UL (ref 4–5.2)
RDW COEFFICIENT OF VARIATION: 19.6 (ref 11.5–15)
SODIUM SERPL-SCNC: 138 MMOL/L (ref 136–145)
WBC NRBC COR # BLD AUTO: 12.2 K/UL (ref 4.3–11)

## 2018-04-21 RX ADMIN — INSULIN HUMAN PRN UNIT: 100 INJECTION, SOLUTION PARENTERAL at 21:17

## 2018-04-21 RX ADMIN — Medication SCH EACH: at 21:03

## 2018-04-21 RX ADMIN — Medication SCH MG: at 08:17

## 2018-04-21 RX ADMIN — METOPROLOL TARTRATE SCH MG: 25 TABLET, FILM COATED ORAL at 16:41

## 2018-04-21 RX ADMIN — LEVOTHYROXINE SODIUM SCH MCG: 100 TABLET ORAL at 08:17

## 2018-04-21 RX ADMIN — LOSARTAN POTASSIUM SCH MG: 50 TABLET, FILM COATED ORAL at 08:17

## 2018-04-21 RX ADMIN — OXYCODONE HYDROCHLORIDE AND ACETAMINOPHEN SCH MG: 500 TABLET ORAL at 08:17

## 2018-04-21 RX ADMIN — FUROSEMIDE SCH MG: 10 INJECTION, SOLUTION INTRAMUSCULAR; INTRAVENOUS at 08:17

## 2018-04-21 RX ADMIN — ALBUTEROL SULFATE SCH MG: 2.5 SOLUTION RESPIRATORY (INHALATION) at 03:16

## 2018-04-21 RX ADMIN — Medication SCH EACH: at 08:15

## 2018-04-21 RX ADMIN — Medication SCH TAB: at 08:17

## 2018-04-21 RX ADMIN — Medication SCH MG: at 08:16

## 2018-04-21 RX ADMIN — Medication SCH EACH: at 11:51

## 2018-04-21 RX ADMIN — METOPROLOL TARTRATE SCH MG: 25 TABLET, FILM COATED ORAL at 08:16

## 2018-04-21 RX ADMIN — PHENYLEPHRINE HYDROCHLORIDE AND FAT, HARD SCH EA: .00525; 1.86 SUPPOSITORY RECTAL at 21:03

## 2018-04-21 RX ADMIN — AMLODIPINE BESYLATE SCH MG: 5 TABLET ORAL at 16:42

## 2018-04-21 RX ADMIN — ALBUTEROL SULFATE SCH MG: 2.5 SOLUTION RESPIRATORY (INHALATION) at 23:38

## 2018-04-21 RX ADMIN — AMLODIPINE BESYLATE SCH MG: 5 TABLET ORAL at 08:17

## 2018-04-21 RX ADMIN — INSULIN HUMAN PRN UNIT: 100 INJECTION, SOLUTION PARENTERAL at 16:53

## 2018-04-21 RX ADMIN — ALBUTEROL SULFATE SCH MG: 2.5 SOLUTION RESPIRATORY (INHALATION) at 07:24

## 2018-04-21 RX ADMIN — FOLIC ACID SCH MG: 1 TABLET ORAL at 08:17

## 2018-04-21 RX ADMIN — ALBUTEROL SULFATE SCH MG: 2.5 SOLUTION RESPIRATORY (INHALATION) at 19:31

## 2018-04-21 RX ADMIN — INSULIN HUMAN PRN UNIT: 100 INJECTION, SOLUTION PARENTERAL at 08:18

## 2018-04-21 RX ADMIN — ALBUTEROL SULFATE SCH MG: 2.5 SOLUTION RESPIRATORY (INHALATION) at 15:40

## 2018-04-21 RX ADMIN — FUROSEMIDE SCH MG: 10 INJECTION, SOLUTION INTRAMUSCULAR; INTRAVENOUS at 16:27

## 2018-04-21 RX ADMIN — Medication SCH GM: at 20:51

## 2018-04-21 RX ADMIN — Medication SCH EACH: at 16:38

## 2018-04-21 RX ADMIN — RALOXIFENE HYDROCHLORIDE SCH MG: 60 TABLET ORAL at 08:17

## 2018-04-21 RX ADMIN — INSULIN HUMAN PRN UNIT: 100 INJECTION, SOLUTION PARENTERAL at 11:52

## 2018-04-21 RX ADMIN — Medication SCH GM: at 08:20

## 2018-04-21 RX ADMIN — ALBUTEROL SULFATE SCH MG: 2.5 SOLUTION RESPIRATORY (INHALATION) at 11:32

## 2018-04-21 RX ADMIN — Medication PRN EACH: at 09:14

## 2018-04-21 RX ADMIN — Medication PRN EACH: at 16:40

## 2018-04-21 NOTE — NUR
RN NOTE



PATIENT REMAINED STABLE THROUGHOUT SHIFT. NO ACUTE CHANGES OR DISTRESS NOTED. WILL ENDORSE 
TO NEXT SHIFT TO MONITOR CONTINUITY OF CARE.

## 2018-04-21 NOTE — NUR
RN TEL INITIAL NOTES



RECEIVED PATIENT ALERT AND ORIENTED X 3 SHE IS ABLE TO MAKE THINGS KNOWN AND VERBALIZE 
NEEDS. ON CONTINUOUS O2 OF 2L VIA NC WITH NO DISTRESS NOTED. BREATHING EVEN AND UNLABORED. 
ON CARDIAC MONITOR OF  V PACING HR OF 79. LEFT WRIST IV SITE INTACT AND PATENT. ALL NEEDS 
ATTENDED AND ASSISTED. BED LOW AND LOCKED POSITION, WILL CONTINUE TO MONITOR CONTINUITY OF 
CARE.

## 2018-04-21 NOTE — NUR
RN NOTES



RECEIVED PATIENT ALERT AND ORIENTED X 3 SHE IS ABLE TO MAKE THINGS KNOWN AND VERBALIZE 
NEEDS. ON CONTINUOUS O2 OF 2L VIA NC WITH NO DISTRESS NOTED. BREATHING EVEN AND UNLABORED. 
ON CARDIAC MONITOR OF  V PACING HR OF 76. LEFT WRIST IV SITE INTACT AND PATENT. ALL NEEDS 
ATTENDED AND ASSISTED. BED LOW AND LOCKED POSITION, WILL CONTINUE TO MONITOR CONTINUITY OF 
CARE.

## 2018-04-22 VITALS — DIASTOLIC BLOOD PRESSURE: 50 MMHG | SYSTOLIC BLOOD PRESSURE: 103 MMHG

## 2018-04-22 VITALS — DIASTOLIC BLOOD PRESSURE: 70 MMHG | SYSTOLIC BLOOD PRESSURE: 110 MMHG

## 2018-04-22 VITALS — DIASTOLIC BLOOD PRESSURE: 49 MMHG | SYSTOLIC BLOOD PRESSURE: 104 MMHG

## 2018-04-22 VITALS — DIASTOLIC BLOOD PRESSURE: 50 MMHG | SYSTOLIC BLOOD PRESSURE: 105 MMHG

## 2018-04-22 VITALS — SYSTOLIC BLOOD PRESSURE: 121 MMHG | DIASTOLIC BLOOD PRESSURE: 53 MMHG

## 2018-04-22 VITALS — DIASTOLIC BLOOD PRESSURE: 49 MMHG | SYSTOLIC BLOOD PRESSURE: 98 MMHG

## 2018-04-22 LAB
BASOPHILS # BLD AUTO: 0 /CMM (ref 0–0.2)
BASOPHILS NFR BLD AUTO: 0.2 % (ref 0–2)
BUN SERPL-MCNC: 27 MG/DL (ref 7–18)
CALCIUM SERPL-MCNC: 8.1 MG/DL (ref 8.5–10.1)
CHLORIDE SERPL-SCNC: 97 MMOL/L (ref 98–107)
CO2 SERPL-SCNC: 43 MMOL/L (ref 21–32)
CREAT SERPL-MCNC: 1 MG/DL (ref 0.6–1.3)
EOSINOPHIL NFR BLD AUTO: 2.7 % (ref 0–6)
GLUCOSE SERPL-MCNC: 113 MG/DL (ref 74–106)
HCT VFR BLD AUTO: 26 % (ref 33–45)
HGB BLD-MCNC: 8.5 G/DL (ref 11.5–14.8)
LYMPHOCYTES NFR BLD AUTO: 17.4 % (ref 20–44)
LYMPHOCYTES NFR BLD AUTO: 2.3 /CMM (ref 0.8–4.8)
MCHC RBC AUTO-ENTMCNC: 33 G/DL (ref 31–36)
MCV RBC AUTO: 88 FL (ref 82–100)
MONOCYTES NFR BLD AUTO: 1.2 /CMM (ref 0.1–1.3)
MONOCYTES NFR BLD AUTO: 9.1 % (ref 2–12)
NEUTROPHILS # BLD AUTO: 9.3 /CMM (ref 1.8–8.9)
NEUTROPHILS NFR BLD AUTO: 70.6 % (ref 43–81)
PLATELET # BLD AUTO: 183 /CMM (ref 150–450)
POTASSIUM SERPL-SCNC: 3.4 MMOL/L (ref 3.5–5.1)
RBC # BLD AUTO: 2.93 MIL/UL (ref 4–5.2)
RDW COEFFICIENT OF VARIATION: 18 (ref 11.5–15)
SODIUM SERPL-SCNC: 139 MMOL/L (ref 136–145)
WBC NRBC COR # BLD AUTO: 13.2 K/UL (ref 4.3–11)

## 2018-04-22 RX ADMIN — METOPROLOL TARTRATE SCH MG: 25 TABLET, FILM COATED ORAL at 17:00

## 2018-04-22 RX ADMIN — LEVOTHYROXINE SODIUM SCH MCG: 100 TABLET ORAL at 08:53

## 2018-04-22 RX ADMIN — INSULIN HUMAN PRN UNIT: 100 INJECTION, SOLUTION PARENTERAL at 17:18

## 2018-04-22 RX ADMIN — OXYCODONE HYDROCHLORIDE AND ACETAMINOPHEN SCH MG: 500 TABLET ORAL at 08:51

## 2018-04-22 RX ADMIN — ALBUTEROL SULFATE SCH MG: 2.5 SOLUTION RESPIRATORY (INHALATION) at 15:30

## 2018-04-22 RX ADMIN — Medication SCH GM: at 20:36

## 2018-04-22 RX ADMIN — ALBUTEROL SULFATE SCH MG: 2.5 SOLUTION RESPIRATORY (INHALATION) at 11:01

## 2018-04-22 RX ADMIN — MEROPENEM SCH MLS/HR: 500 INJECTION INTRAVENOUS at 11:51

## 2018-04-22 RX ADMIN — AMLODIPINE BESYLATE SCH MG: 5 TABLET ORAL at 08:52

## 2018-04-22 RX ADMIN — FUROSEMIDE SCH MG: 10 INJECTION, SOLUTION INTRAMUSCULAR; INTRAVENOUS at 17:12

## 2018-04-22 RX ADMIN — Medication SCH EACH: at 11:50

## 2018-04-22 RX ADMIN — INSULIN HUMAN PRN UNIT: 100 INJECTION, SOLUTION PARENTERAL at 11:57

## 2018-04-22 RX ADMIN — ALBUTEROL SULFATE SCH MG: 2.5 SOLUTION RESPIRATORY (INHALATION) at 03:53

## 2018-04-22 RX ADMIN — Medication SCH EACH: at 17:09

## 2018-04-22 RX ADMIN — LOSARTAN POTASSIUM SCH MG: 50 TABLET, FILM COATED ORAL at 08:51

## 2018-04-22 RX ADMIN — AMLODIPINE BESYLATE SCH MG: 5 TABLET ORAL at 17:00

## 2018-04-22 RX ADMIN — Medication SCH TAB: at 08:52

## 2018-04-22 RX ADMIN — MEROPENEM SCH MLS/HR: 500 INJECTION INTRAVENOUS at 23:41

## 2018-04-22 RX ADMIN — FOLIC ACID SCH MG: 1 TABLET ORAL at 08:52

## 2018-04-22 RX ADMIN — ALBUTEROL SULFATE SCH MG: 2.5 SOLUTION RESPIRATORY (INHALATION) at 19:40

## 2018-04-22 RX ADMIN — RALOXIFENE HYDROCHLORIDE SCH MG: 60 TABLET ORAL at 08:52

## 2018-04-22 RX ADMIN — Medication SCH MG: at 08:52

## 2018-04-22 RX ADMIN — PHENYLEPHRINE HYDROCHLORIDE AND FAT, HARD SCH EA: .00525; 1.86 SUPPOSITORY RECTAL at 21:35

## 2018-04-22 RX ADMIN — HUMAN INSULIN PRN UNIT: 100 INJECTION, SOLUTION SUBCUTANEOUS at 21:40

## 2018-04-22 RX ADMIN — METOPROLOL TARTRATE SCH MG: 25 TABLET, FILM COATED ORAL at 08:52

## 2018-04-22 RX ADMIN — ALBUTEROL SULFATE SCH MG: 2.5 SOLUTION RESPIRATORY (INHALATION) at 07:06

## 2018-04-22 RX ADMIN — ALBUTEROL SULFATE SCH MG: 2.5 SOLUTION RESPIRATORY (INHALATION) at 23:50

## 2018-04-22 RX ADMIN — Medication SCH EACH: at 08:49

## 2018-04-22 RX ADMIN — Medication PRN EACH: at 00:42

## 2018-04-22 RX ADMIN — FUROSEMIDE SCH MG: 10 INJECTION, SOLUTION INTRAMUSCULAR; INTRAVENOUS at 08:51

## 2018-04-22 RX ADMIN — Medication SCH EACH: at 21:35

## 2018-04-22 RX ADMIN — Medication SCH GM: at 08:51

## 2018-04-22 NOTE — NUR
RN TEL CLOSING NOTES



ENDORSED PATIENT ALERT AND ORIENTED X 3 SHE IS ABLE TO MAKE THINGS KNOWN AND VERBALIZE 
NEEDS. ON CONTINUOUS O2 OF 2L VIA NC WITH NO DISTRESS NOTED. BREATHING EVEN AND UNLABORED. 
ON CARDIAC MONITOR OF  V PACING HR OF 70'S. LEFT WRIST IV SITE INTACT AND PATENT. ALL NEEDS 
ATTENDED AND ASSISTED. BED LOW AND LOCKED POSITION, WILL CONTINUE TO MONITOR CONTINUITY OF 
CARE.

## 2018-04-22 NOTE — NUR
MS1/RN     AM SHIFT END NOTES



ALL NEEDS MET.  NO ACUTE CHANGE OF CONDITION NOTED DURING THE SHIFT.  IV SITE INTACT, SL. 
DENT CATHETER INTACT.  PT ENDORSED TO PM NURSE TO CONTINUE CARE.  CL WITHIN REACHED AND 
SAFETY MAINTAINED.

## 2018-04-22 NOTE — NUR
TELE1/RN     AM SHIFT INITIAL NOTES



RECEIVED PT ASLEEP IN BED, PT A/OX 4, AROUSEABLE.  NO ACUTE CHANGE OF CONDITION, NO SOB. ON 
2L O2 VIA N/C SATURATING @ 96%, LUNG SOUNDS DIMINISHED.  ON TELE, V-PACING, HR 81.  IV SITE 
FLUSHED, PATENT WITH NO S/S OF INFECTION, SL.  DENT CATHETER INTACT WITH YELLOW URINE 
OUTPUT.  BLOOD GLUCOSE CHECKED, 128, NO S/S OF HYPER OR HYPOGLYCEMIA, NO COVERAGE TO BE 
GIVEN PER SLIDING SCALE. PT IS COMFORTABLE.  SCHEDULED AM MEDS TO BE GIVEN.  CL WITHIN 
REACHED AND SAFETY MAINTAINED.  ON GOING MONITORING.

## 2018-04-23 VITALS — DIASTOLIC BLOOD PRESSURE: 53 MMHG | SYSTOLIC BLOOD PRESSURE: 105 MMHG

## 2018-04-23 VITALS — SYSTOLIC BLOOD PRESSURE: 100 MMHG | DIASTOLIC BLOOD PRESSURE: 52 MMHG

## 2018-04-23 VITALS — SYSTOLIC BLOOD PRESSURE: 105 MMHG | DIASTOLIC BLOOD PRESSURE: 53 MMHG

## 2018-04-23 VITALS — DIASTOLIC BLOOD PRESSURE: 46 MMHG | SYSTOLIC BLOOD PRESSURE: 101 MMHG

## 2018-04-23 LAB
BASOPHILS # BLD AUTO: 0 /CMM (ref 0–0.2)
BASOPHILS NFR BLD AUTO: 0.1 % (ref 0–2)
BUN SERPL-MCNC: 32 MG/DL (ref 7–18)
CALCIUM SERPL-MCNC: 8.4 MG/DL (ref 8.5–10.1)
CHLORIDE SERPL-SCNC: 99 MMOL/L (ref 98–107)
CO2 SERPL-SCNC: 40 MMOL/L (ref 21–32)
CREAT SERPL-MCNC: 1.2 MG/DL (ref 0.6–1.3)
EOSINOPHIL NFR BLD AUTO: 1.9 % (ref 0–6)
GLUCOSE SERPL-MCNC: 108 MG/DL (ref 74–106)
HCT VFR BLD AUTO: 25 % (ref 33–45)
HGB BLD-MCNC: 8.3 G/DL (ref 11.5–14.8)
LYMPHOCYTES NFR BLD AUTO: 1.9 /CMM (ref 0.8–4.8)
LYMPHOCYTES NFR BLD AUTO: 14.4 % (ref 20–44)
MCHC RBC AUTO-ENTMCNC: 33 G/DL (ref 31–36)
MCV RBC AUTO: 88 FL (ref 82–100)
MONOCYTES NFR BLD AUTO: 1 /CMM (ref 0.1–1.3)
MONOCYTES NFR BLD AUTO: 7.8 % (ref 2–12)
NEUTROPHILS # BLD AUTO: 10.2 /CMM (ref 1.8–8.9)
NEUTROPHILS NFR BLD AUTO: 75.8 % (ref 43–81)
PLATELET # BLD AUTO: 175 /CMM (ref 150–450)
POTASSIUM SERPL-SCNC: 3.3 MMOL/L (ref 3.5–5.1)
RBC # BLD AUTO: 2.83 MIL/UL (ref 4–5.2)
RDW COEFFICIENT OF VARIATION: 17.8 (ref 11.5–15)
SODIUM SERPL-SCNC: 141 MMOL/L (ref 136–145)
WBC NRBC COR # BLD AUTO: 13.4 K/UL (ref 4.3–11)

## 2018-04-23 RX ADMIN — ALBUTEROL SULFATE SCH MG: 2.5 SOLUTION RESPIRATORY (INHALATION) at 07:02

## 2018-04-23 RX ADMIN — Medication SCH MG: at 08:27

## 2018-04-23 RX ADMIN — Medication SCH TAB: at 08:26

## 2018-04-23 RX ADMIN — INSULIN HUMAN PRN UNIT: 100 INJECTION, SOLUTION PARENTERAL at 08:41

## 2018-04-23 RX ADMIN — INSULIN HUMAN PRN UNIT: 100 INJECTION, SOLUTION PARENTERAL at 11:29

## 2018-04-23 RX ADMIN — LEVOTHYROXINE SODIUM SCH MCG: 100 TABLET ORAL at 08:25

## 2018-04-23 RX ADMIN — AMLODIPINE BESYLATE SCH MG: 5 TABLET ORAL at 08:29

## 2018-04-23 RX ADMIN — RALOXIFENE HYDROCHLORIDE SCH MG: 60 TABLET ORAL at 08:27

## 2018-04-23 RX ADMIN — MEROPENEM SCH MLS/HR: 500 INJECTION INTRAVENOUS at 11:13

## 2018-04-23 RX ADMIN — ALBUTEROL SULFATE SCH MG: 2.5 SOLUTION RESPIRATORY (INHALATION) at 11:00

## 2018-04-23 RX ADMIN — ALBUTEROL SULFATE SCH MG: 2.5 SOLUTION RESPIRATORY (INHALATION) at 15:25

## 2018-04-23 RX ADMIN — Medication SCH EACH: at 08:24

## 2018-04-23 RX ADMIN — FUROSEMIDE SCH MG: 10 INJECTION, SOLUTION INTRAMUSCULAR; INTRAVENOUS at 08:26

## 2018-04-23 RX ADMIN — METOPROLOL TARTRATE SCH MG: 25 TABLET, FILM COATED ORAL at 08:26

## 2018-04-23 RX ADMIN — Medication SCH GM: at 08:28

## 2018-04-23 RX ADMIN — Medication SCH EACH: at 11:15

## 2018-04-23 RX ADMIN — ALBUTEROL SULFATE SCH MG: 2.5 SOLUTION RESPIRATORY (INHALATION) at 03:11

## 2018-04-23 RX ADMIN — FOLIC ACID SCH MG: 1 TABLET ORAL at 08:27

## 2018-04-23 RX ADMIN — OXYCODONE HYDROCHLORIDE AND ACETAMINOPHEN SCH MG: 500 TABLET ORAL at 08:27

## 2018-04-23 RX ADMIN — LOSARTAN POTASSIUM SCH MG: 50 TABLET, FILM COATED ORAL at 08:30

## 2018-04-23 NOTE — NUR
MS SPICER NOTES



PATIENT DISCHCARGED TO [facility] PER MD IN STABLE CONDITION. PROVIDED DC 
INSTRUCTIONS, MED RECON LIST AND HEALTH TEACHINGS. PATIENT TO FOLLOW UP WITH PCP IN 1 WEEK 
OR PER FACILITY PROTOCOL. LEFT WRIST IV ACCESS G 22 FLUSHES WELL. SITE CLEAR. IV ACCESS FOR 
CONTINUATION OF IV ATB. DENT CATH IN PLACE WITH 450 ML OUTPUT. ALL BELONGINGS CHECKED AND 
RETURNED. ALL PAPERWORKS SIGNED. PATIENT PICKED UP BY 2 AMBULANCE PERSONNEL TO TRANSPORT 
PATIENT TO FACILITY. PHOTOS OF SKIN ISSUES INSIDE THE CHART.



REPORT GIVEN TO MARILEE SPICER AT FACILITY STAFF EARLIER.

-------------------------------------------------------------------------------

Addendum: 04/23/18 at 1837 by ROSANA MUNSON RN

-------------------------------------------------------------------------------

CORRECTION:



TOTAL URINE OUTPUT IS 1800 ML.

## 2018-05-24 ENCOUNTER — HOSPITAL ENCOUNTER (INPATIENT)
Dept: HOSPITAL 54 - ER | Age: 83
LOS: 11 days | Discharge: NURSING, REHAB OR LONG-TERM CARE | DRG: 194 | End: 2018-06-04
Attending: INTERNAL MEDICINE | Admitting: INTERNAL MEDICINE
Payer: COMMERCIAL

## 2018-05-24 VITALS — HEIGHT: 60 IN | BODY MASS INDEX: 31.22 KG/M2 | WEIGHT: 159 LBS

## 2018-05-24 DIAGNOSIS — I34.0: ICD-10-CM

## 2018-05-24 DIAGNOSIS — D63.1: ICD-10-CM

## 2018-05-24 DIAGNOSIS — N18.9: ICD-10-CM

## 2018-05-24 DIAGNOSIS — Z95.810: ICD-10-CM

## 2018-05-24 DIAGNOSIS — F03.90: ICD-10-CM

## 2018-05-24 DIAGNOSIS — Z95.1: ICD-10-CM

## 2018-05-24 DIAGNOSIS — E43: ICD-10-CM

## 2018-05-24 DIAGNOSIS — N39.0: ICD-10-CM

## 2018-05-24 DIAGNOSIS — E87.3: ICD-10-CM

## 2018-05-24 DIAGNOSIS — J96.22: ICD-10-CM

## 2018-05-24 DIAGNOSIS — Z22.322: ICD-10-CM

## 2018-05-24 DIAGNOSIS — E89.0: ICD-10-CM

## 2018-05-24 DIAGNOSIS — L98.8: ICD-10-CM

## 2018-05-24 DIAGNOSIS — G92: ICD-10-CM

## 2018-05-24 DIAGNOSIS — L30.4: ICD-10-CM

## 2018-05-24 DIAGNOSIS — Z96.649: ICD-10-CM

## 2018-05-24 DIAGNOSIS — I25.10: ICD-10-CM

## 2018-05-24 DIAGNOSIS — E83.42: ICD-10-CM

## 2018-05-24 DIAGNOSIS — E78.5: ICD-10-CM

## 2018-05-24 DIAGNOSIS — I11.0: Primary | ICD-10-CM

## 2018-05-24 DIAGNOSIS — E11.22: ICD-10-CM

## 2018-05-24 DIAGNOSIS — J90: ICD-10-CM

## 2018-05-24 DIAGNOSIS — E87.5: ICD-10-CM

## 2018-05-24 DIAGNOSIS — F09: ICD-10-CM

## 2018-05-24 DIAGNOSIS — F32.9: ICD-10-CM

## 2018-05-24 DIAGNOSIS — M81.0: ICD-10-CM

## 2018-05-24 DIAGNOSIS — I50.33: ICD-10-CM

## 2018-05-24 DIAGNOSIS — J44.1: ICD-10-CM

## 2018-05-24 DIAGNOSIS — E66.9: ICD-10-CM

## 2018-05-24 DIAGNOSIS — I70.90: ICD-10-CM

## 2018-05-24 DIAGNOSIS — Z88.0: ICD-10-CM

## 2018-05-24 DIAGNOSIS — J96.21: ICD-10-CM

## 2018-05-24 DIAGNOSIS — E87.6: ICD-10-CM

## 2018-05-24 LAB
ALBUMIN SERPL BCP-MCNC: 3 G/DL (ref 3.4–5)
ALP SERPL-CCNC: 90 U/L (ref 46–116)
ALT SERPL W P-5'-P-CCNC: 18 U/L (ref 12–78)
APPEARANCE UR: (no result)
APTT PPP: 26 SEC (ref 23–34)
AST SERPL W P-5'-P-CCNC: 16 U/L (ref 15–37)
BASOPHILS # BLD AUTO: 0 /CMM (ref 0–0.2)
BASOPHILS NFR BLD AUTO: 0 % (ref 0–2)
BILIRUB DIRECT SERPL-MCNC: 0.1 MG/DL (ref 0–0.2)
BILIRUB SERPL-MCNC: 0.2 MG/DL (ref 0.2–1)
BILIRUB UR QL STRIP: NEGATIVE
BUN SERPL-MCNC: 53 MG/DL (ref 7–18)
CALCIUM SERPL-MCNC: 9.2 MG/DL (ref 8.5–10.1)
CHLORIDE SERPL-SCNC: 100 MMOL/L (ref 98–107)
CO2 SERPL-SCNC: 39 MMOL/L (ref 21–32)
COLOR UR: YELLOW
CREAT SERPL-MCNC: 1.4 MG/DL (ref 0.6–1.3)
EOSINOPHIL NFR BLD AUTO: 0.9 % (ref 0–6)
GLUCOSE SERPL-MCNC: 246 MG/DL (ref 74–106)
GLUCOSE UR STRIP-MCNC: NEGATIVE MG/DL
HCT VFR BLD AUTO: 27 % (ref 33–45)
HGB BLD-MCNC: 8.7 G/DL (ref 11.5–14.8)
HGB UR QL STRIP: (no result) ERY/UL
INR PPP: 1.19 (ref 0.87–1.13)
KETONES UR STRIP-MCNC: NEGATIVE MG/DL
LEUKOCYTE ESTERASE UR QL STRIP: (no result)
LYMPHOCYTES NFR BLD AUTO: 1.4 /CMM (ref 0.8–4.8)
LYMPHOCYTES NFR BLD AUTO: 12.6 % (ref 20–44)
MCHC RBC AUTO-ENTMCNC: 33 G/DL (ref 31–36)
MCV RBC AUTO: 88 FL (ref 82–100)
MONOCYTES NFR BLD AUTO: 0.9 /CMM (ref 0.1–1.3)
MONOCYTES NFR BLD AUTO: 7.9 % (ref 2–12)
NEUTROPHILS # BLD AUTO: 8.5 /CMM (ref 1.8–8.9)
NEUTROPHILS NFR BLD AUTO: 78.6 % (ref 43–81)
NITRITE UR QL STRIP: NEGATIVE
NT-PROBNP SERPL-MCNC: 5390 PG/ML (ref 0–125)
PH UR STRIP: 5.5 [PH] (ref 5–8)
PLATELET # BLD AUTO: 240 /CMM (ref 150–450)
POTASSIUM SERPL-SCNC: 5.4 MMOL/L (ref 3.5–5.1)
PROT SERPL-MCNC: 7.7 G/DL (ref 6.4–8.2)
PROT UR QL STRIP: (no result) MG/DL
RBC # BLD AUTO: 3.02 MIL/UL (ref 4–5.2)
RBC #/AREA URNS HPF: (no result) /HPF (ref 0–2)
RDW COEFFICIENT OF VARIATION: 18.7 (ref 11.5–15)
SODIUM SERPL-SCNC: 140 MMOL/L (ref 136–145)
TROPONIN I SERPL-MCNC: < 0.017 NG/ML (ref 0–0.06)
UROBILINOGEN UR STRIP-MCNC: 0.2 EU/DL
WBC #/AREA URNS HPF: (no result) /HPF (ref 0–3)
WBC NRBC COR # BLD AUTO: 10.8 K/UL (ref 4.3–11)

## 2018-05-24 PROCEDURE — Z7610: HCPCS

## 2018-05-24 PROCEDURE — A4216 STERILE WATER/SALINE, 10 ML: HCPCS

## 2018-05-24 PROCEDURE — A6403 STERILE GAUZE>16 <= 48 SQ IN: HCPCS

## 2018-05-24 PROCEDURE — A6253 ABSORPT DRG > 48 SQ IN W/O B: HCPCS

## 2018-05-24 PROCEDURE — A4606 OXYGEN PROBE USED W OXIMETER: HCPCS

## 2018-05-25 VITALS — SYSTOLIC BLOOD PRESSURE: 129 MMHG | DIASTOLIC BLOOD PRESSURE: 67 MMHG

## 2018-05-25 VITALS — SYSTOLIC BLOOD PRESSURE: 136 MMHG | DIASTOLIC BLOOD PRESSURE: 71 MMHG

## 2018-05-25 VITALS — DIASTOLIC BLOOD PRESSURE: 66 MMHG | SYSTOLIC BLOOD PRESSURE: 145 MMHG

## 2018-05-25 VITALS — DIASTOLIC BLOOD PRESSURE: 64 MMHG | SYSTOLIC BLOOD PRESSURE: 136 MMHG

## 2018-05-25 VITALS — SYSTOLIC BLOOD PRESSURE: 150 MMHG | DIASTOLIC BLOOD PRESSURE: 70 MMHG

## 2018-05-25 VITALS — DIASTOLIC BLOOD PRESSURE: 92 MMHG | SYSTOLIC BLOOD PRESSURE: 129 MMHG

## 2018-05-25 VITALS — DIASTOLIC BLOOD PRESSURE: 62 MMHG | SYSTOLIC BLOOD PRESSURE: 120 MMHG

## 2018-05-25 VITALS — DIASTOLIC BLOOD PRESSURE: 72 MMHG | SYSTOLIC BLOOD PRESSURE: 126 MMHG

## 2018-05-25 VITALS — DIASTOLIC BLOOD PRESSURE: 68 MMHG | SYSTOLIC BLOOD PRESSURE: 146 MMHG

## 2018-05-25 VITALS — SYSTOLIC BLOOD PRESSURE: 118 MMHG | DIASTOLIC BLOOD PRESSURE: 63 MMHG

## 2018-05-25 VITALS — SYSTOLIC BLOOD PRESSURE: 152 MMHG | DIASTOLIC BLOOD PRESSURE: 72 MMHG

## 2018-05-25 VITALS — SYSTOLIC BLOOD PRESSURE: 120 MMHG | DIASTOLIC BLOOD PRESSURE: 62 MMHG

## 2018-05-25 VITALS — SYSTOLIC BLOOD PRESSURE: 103 MMHG | DIASTOLIC BLOOD PRESSURE: 61 MMHG

## 2018-05-25 VITALS — SYSTOLIC BLOOD PRESSURE: 136 MMHG | DIASTOLIC BLOOD PRESSURE: 66 MMHG

## 2018-05-25 VITALS — SYSTOLIC BLOOD PRESSURE: 124 MMHG | DIASTOLIC BLOOD PRESSURE: 57 MMHG

## 2018-05-25 VITALS — SYSTOLIC BLOOD PRESSURE: 155 MMHG | DIASTOLIC BLOOD PRESSURE: 72 MMHG

## 2018-05-25 VITALS — DIASTOLIC BLOOD PRESSURE: 62 MMHG | SYSTOLIC BLOOD PRESSURE: 124 MMHG

## 2018-05-25 VITALS — DIASTOLIC BLOOD PRESSURE: 64 MMHG | SYSTOLIC BLOOD PRESSURE: 122 MMHG

## 2018-05-25 VITALS — SYSTOLIC BLOOD PRESSURE: 128 MMHG | DIASTOLIC BLOOD PRESSURE: 66 MMHG

## 2018-05-25 VITALS — DIASTOLIC BLOOD PRESSURE: 70 MMHG | SYSTOLIC BLOOD PRESSURE: 150 MMHG

## 2018-05-25 VITALS — DIASTOLIC BLOOD PRESSURE: 57 MMHG | SYSTOLIC BLOOD PRESSURE: 124 MMHG

## 2018-05-25 VITALS — DIASTOLIC BLOOD PRESSURE: 64 MMHG | SYSTOLIC BLOOD PRESSURE: 129 MMHG

## 2018-05-25 VITALS — DIASTOLIC BLOOD PRESSURE: 66 MMHG | SYSTOLIC BLOOD PRESSURE: 128 MMHG

## 2018-05-25 VITALS — SYSTOLIC BLOOD PRESSURE: 135 MMHG | DIASTOLIC BLOOD PRESSURE: 68 MMHG

## 2018-05-25 VITALS — SYSTOLIC BLOOD PRESSURE: 146 MMHG | DIASTOLIC BLOOD PRESSURE: 80 MMHG

## 2018-05-25 VITALS — DIASTOLIC BLOOD PRESSURE: 63 MMHG | SYSTOLIC BLOOD PRESSURE: 124 MMHG

## 2018-05-25 LAB
BASE EXCESS BLDA CALC-SCNC: 10.6 MMOL/L
BASE EXCESS BLDA CALC-SCNC: 9.3 MMOL/L
BASOPHILS # BLD AUTO: 0 /CMM (ref 0–0.2)
BASOPHILS NFR BLD AUTO: 0 % (ref 0–2)
BUN SERPL-MCNC: 56 MG/DL (ref 7–18)
CALCIUM SERPL-MCNC: 8.8 MG/DL (ref 8.5–10.1)
CHLORIDE SERPL-SCNC: 99 MMOL/L (ref 98–107)
CO2 SERPL-SCNC: 36 MMOL/L (ref 21–32)
CREAT SERPL-MCNC: 1.6 MG/DL (ref 0.6–1.3)
DO-HGB MFR BLDA: 0 MMHG
DO-HGB MFR BLDA: 128.2 MMHG
EOSINOPHIL NFR BLD AUTO: 0.1 % (ref 0–6)
GLUCOSE SERPL-MCNC: 349 MG/DL (ref 74–106)
HCT VFR BLD AUTO: 25 % (ref 33–45)
HGB BLD-MCNC: 8.1 G/DL (ref 11.5–14.8)
INHALED O2 CONCENTRATION: 28 %
INHALED O2 CONCENTRATION: 40 %
INHALED O2 FLOW RATE: 2 L/MIN (ref 0–30)
LYMPHOCYTES NFR BLD AUTO: 0.4 /CMM (ref 0.8–4.8)
LYMPHOCYTES NFR BLD AUTO: 3.7 % (ref 20–44)
MCHC RBC AUTO-ENTMCNC: 32 G/DL (ref 31–36)
MCV RBC AUTO: 89 FL (ref 82–100)
MONOCYTES NFR BLD AUTO: 0.7 /CMM (ref 0.1–1.3)
MONOCYTES NFR BLD AUTO: 5.9 % (ref 2–12)
NEUTROPHILS # BLD AUTO: 10.1 /CMM (ref 1.8–8.9)
NEUTROPHILS NFR BLD AUTO: 90.3 % (ref 43–81)
PCO2 TEMP ADJ BLDA: 118.8 MMHG (ref 35–45)
PCO2 TEMP ADJ BLDA: 71 MMHG (ref 35–45)
PH TEMP ADJ BLDA: 7.16 [PH] (ref 7.35–7.45)
PH TEMP ADJ BLDA: 7.35 [PH] (ref 7.35–7.45)
PLATELET # BLD AUTO: 184 /CMM (ref 150–450)
PO2 TEMP ADJ BLDA: 69.3 MMHG (ref 75–100)
PO2 TEMP ADJ BLDA: 75.4 MMHG (ref 75–100)
POTASSIUM SERPL-SCNC: 4.9 MMOL/L (ref 3.5–5.1)
RBC # BLD AUTO: 2.87 MIL/UL (ref 4–5.2)
RDW COEFFICIENT OF VARIATION: 18.8 (ref 11.5–15)
SAO2 % BLDA: 87.3 % (ref 92–98.5)
SAO2 % BLDA: 93.1 % (ref 92–98.5)
SET RATE, BG: 24
SODIUM SERPL-SCNC: 137 MMOL/L (ref 136–145)
VENTILATION MODE VENT: (no result)
WBC NRBC COR # BLD AUTO: 11.2 K/UL (ref 4.3–11)

## 2018-05-25 PROCEDURE — 5A09457 ASSISTANCE WITH RESPIRATORY VENTILATION, 24-96 CONSECUTIVE HOURS, CONTINUOUS POSITIVE AIRWAY PRESSURE: ICD-10-PCS | Performed by: INTERNAL MEDICINE

## 2018-05-25 PROCEDURE — 05HY33Z INSERTION OF INFUSION DEVICE INTO UPPER VEIN, PERCUTANEOUS APPROACH: ICD-10-PCS | Performed by: NURSE PRACTITIONER

## 2018-05-25 RX ADMIN — Medication SCH MG: at 19:26

## 2018-05-25 RX ADMIN — ENOXAPARIN SODIUM SCH MG: 30 INJECTION SUBCUTANEOUS at 09:50

## 2018-05-25 RX ADMIN — LOSARTAN POTASSIUM SCH MG: 50 TABLET, FILM COATED ORAL at 09:00

## 2018-05-25 RX ADMIN — ALBUTEROL SULFATE SCH MG: 1.25 SOLUTION RESPIRATORY (INHALATION) at 19:26

## 2018-05-25 RX ADMIN — FOLIC ACID SCH MG: 1 TABLET ORAL at 09:00

## 2018-05-25 RX ADMIN — AMLODIPINE BESYLATE SCH MG: 5 TABLET ORAL at 09:00

## 2018-05-25 RX ADMIN — Medication SCH MG: at 22:42

## 2018-05-25 RX ADMIN — Medication SCH EACH: at 12:14

## 2018-05-25 RX ADMIN — SODIUM CHLORIDE SCH MG: 9 INJECTION, SOLUTION INTRAVENOUS at 10:34

## 2018-05-25 RX ADMIN — MEROPENEM SCH MLS/HR: 500 INJECTION INTRAVENOUS at 21:24

## 2018-05-25 RX ADMIN — ALBUTEROL SULFATE SCH MG: 1.25 SOLUTION RESPIRATORY (INHALATION) at 14:46

## 2018-05-25 RX ADMIN — Medication SCH EACH: at 17:03

## 2018-05-25 RX ADMIN — INSULIN HUMAN PRN UNIT: 100 INJECTION, SOLUTION PARENTERAL at 17:06

## 2018-05-25 RX ADMIN — Medication SCH MG: at 14:44

## 2018-05-25 RX ADMIN — AMLODIPINE BESYLATE SCH MG: 5 TABLET ORAL at 16:29

## 2018-05-25 RX ADMIN — CLOPIDOGREL BISULFATE SCH MG: 75 TABLET, FILM COATED ORAL at 09:00

## 2018-05-25 RX ADMIN — THERA TABS SCH UDTAB: TAB at 09:00

## 2018-05-25 RX ADMIN — Medication SCH EACH: at 21:49

## 2018-05-25 RX ADMIN — SODIUM CHLORIDE SCH MG: 9 INJECTION, SOLUTION INTRAVENOUS at 07:41

## 2018-05-25 RX ADMIN — Medication SCH OZ: at 21:49

## 2018-05-25 RX ADMIN — Medication SCH EACH: at 13:53

## 2018-05-25 RX ADMIN — LEVOTHYROXINE SODIUM SCH MCG: 100 TABLET ORAL at 07:30

## 2018-05-25 RX ADMIN — METOPROLOL TARTRATE SCH MG: 25 TABLET, FILM COATED ORAL at 16:29

## 2018-05-25 RX ADMIN — Medication SCH MG: at 09:00

## 2018-05-25 RX ADMIN — METOPROLOL TARTRATE SCH MG: 25 TABLET, FILM COATED ORAL at 09:00

## 2018-05-25 RX ADMIN — SODIUM CHLORIDE SCH MG: 9 INJECTION, SOLUTION INTRAVENOUS at 15:04

## 2018-05-25 RX ADMIN — OXYCODONE HYDROCHLORIDE AND ACETAMINOPHEN SCH MG: 500 TABLET ORAL at 09:00

## 2018-05-25 RX ADMIN — Medication SCH EACH: at 06:20

## 2018-05-25 RX ADMIN — MEROPENEM SCH MLS/HR: 500 INJECTION INTRAVENOUS at 12:12

## 2018-05-25 RX ADMIN — ALBUTEROL SULFATE SCH MG: 1.25 SOLUTION RESPIRATORY (INHALATION) at 22:42

## 2018-05-25 RX ADMIN — INSULIN HUMAN PRN UNIT: 100 INJECTION, SOLUTION PARENTERAL at 13:54

## 2018-05-26 VITALS — SYSTOLIC BLOOD PRESSURE: 139 MMHG | DIASTOLIC BLOOD PRESSURE: 72 MMHG

## 2018-05-26 VITALS — SYSTOLIC BLOOD PRESSURE: 146 MMHG | DIASTOLIC BLOOD PRESSURE: 78 MMHG

## 2018-05-26 VITALS — DIASTOLIC BLOOD PRESSURE: 73 MMHG | SYSTOLIC BLOOD PRESSURE: 142 MMHG

## 2018-05-26 VITALS — SYSTOLIC BLOOD PRESSURE: 116 MMHG | DIASTOLIC BLOOD PRESSURE: 22 MMHG

## 2018-05-26 VITALS — DIASTOLIC BLOOD PRESSURE: 78 MMHG | SYSTOLIC BLOOD PRESSURE: 145 MMHG

## 2018-05-26 VITALS — DIASTOLIC BLOOD PRESSURE: 98 MMHG | SYSTOLIC BLOOD PRESSURE: 142 MMHG

## 2018-05-26 VITALS — SYSTOLIC BLOOD PRESSURE: 134 MMHG | DIASTOLIC BLOOD PRESSURE: 73 MMHG

## 2018-05-26 VITALS — DIASTOLIC BLOOD PRESSURE: 57 MMHG | SYSTOLIC BLOOD PRESSURE: 133 MMHG

## 2018-05-26 VITALS — DIASTOLIC BLOOD PRESSURE: 62 MMHG | SYSTOLIC BLOOD PRESSURE: 130 MMHG

## 2018-05-26 VITALS — SYSTOLIC BLOOD PRESSURE: 125 MMHG | DIASTOLIC BLOOD PRESSURE: 52 MMHG

## 2018-05-26 VITALS — DIASTOLIC BLOOD PRESSURE: 73 MMHG | SYSTOLIC BLOOD PRESSURE: 146 MMHG

## 2018-05-26 VITALS — DIASTOLIC BLOOD PRESSURE: 66 MMHG | SYSTOLIC BLOOD PRESSURE: 128 MMHG

## 2018-05-26 VITALS — DIASTOLIC BLOOD PRESSURE: 61 MMHG | SYSTOLIC BLOOD PRESSURE: 124 MMHG

## 2018-05-26 VITALS — SYSTOLIC BLOOD PRESSURE: 128 MMHG | DIASTOLIC BLOOD PRESSURE: 63 MMHG

## 2018-05-26 VITALS — DIASTOLIC BLOOD PRESSURE: 70 MMHG | SYSTOLIC BLOOD PRESSURE: 143 MMHG

## 2018-05-26 VITALS — DIASTOLIC BLOOD PRESSURE: 59 MMHG | SYSTOLIC BLOOD PRESSURE: 118 MMHG

## 2018-05-26 VITALS — DIASTOLIC BLOOD PRESSURE: 56 MMHG | SYSTOLIC BLOOD PRESSURE: 134 MMHG

## 2018-05-26 VITALS — SYSTOLIC BLOOD PRESSURE: 133 MMHG | DIASTOLIC BLOOD PRESSURE: 57 MMHG

## 2018-05-26 VITALS — SYSTOLIC BLOOD PRESSURE: 127 MMHG | DIASTOLIC BLOOD PRESSURE: 68 MMHG

## 2018-05-26 VITALS — SYSTOLIC BLOOD PRESSURE: 131 MMHG | DIASTOLIC BLOOD PRESSURE: 66 MMHG

## 2018-05-26 VITALS — DIASTOLIC BLOOD PRESSURE: 69 MMHG | SYSTOLIC BLOOD PRESSURE: 136 MMHG

## 2018-05-26 VITALS — DIASTOLIC BLOOD PRESSURE: 68 MMHG | SYSTOLIC BLOOD PRESSURE: 125 MMHG

## 2018-05-26 VITALS — SYSTOLIC BLOOD PRESSURE: 129 MMHG | DIASTOLIC BLOOD PRESSURE: 77 MMHG

## 2018-05-26 VITALS — DIASTOLIC BLOOD PRESSURE: 67 MMHG | SYSTOLIC BLOOD PRESSURE: 131 MMHG

## 2018-05-26 VITALS — SYSTOLIC BLOOD PRESSURE: 124 MMHG | DIASTOLIC BLOOD PRESSURE: 65 MMHG

## 2018-05-26 VITALS — SYSTOLIC BLOOD PRESSURE: 139 MMHG | DIASTOLIC BLOOD PRESSURE: 70 MMHG

## 2018-05-26 LAB
ALBUMIN SERPL BCP-MCNC: 2.5 G/DL (ref 3.4–5)
ALP SERPL-CCNC: 61 U/L (ref 46–116)
ALT SERPL W P-5'-P-CCNC: 16 U/L (ref 12–78)
AST SERPL W P-5'-P-CCNC: 27 U/L (ref 15–37)
BASE EXCESS BLDA CALC-SCNC: 16.5 MMOL/L
BASOPHILS # BLD AUTO: 0.1 /CMM (ref 0–0.2)
BASOPHILS NFR BLD AUTO: 1.1 % (ref 0–2)
BILIRUB SERPL-MCNC: 0.5 MG/DL (ref 0.2–1)
BUN SERPL-MCNC: 46 MG/DL (ref 7–18)
CALCIUM SERPL-MCNC: 8.8 MG/DL (ref 8.5–10.1)
CHLORIDE SERPL-SCNC: 103 MMOL/L (ref 98–107)
CHOLEST SERPL-MCNC: 166 MG/DL (ref ?–200)
CO2 SERPL-SCNC: 41 MMOL/L (ref 21–32)
CREAT SERPL-MCNC: 1.2 MG/DL (ref 0.6–1.3)
DO-HGB MFR BLDA: 40.1 MMHG
EOSINOPHIL NFR BLD AUTO: 0.4 % (ref 0–6)
FERRITIN SERPL-MCNC: 222 NG/ML (ref 8–388)
GLUCOSE SERPL-MCNC: 85 MG/DL (ref 74–106)
HCT VFR BLD AUTO: 23 % (ref 33–45)
HDLC SERPL-MCNC: 33 MG/DL (ref 40–60)
HGB BLD-MCNC: 7.4 G/DL (ref 11.5–14.8)
INHALED O2 CONCENTRATION: 28 %
IRON SERPL-MCNC: 31 UG/DL (ref 50–175)
LDLC SERPL DIRECT ASSAY-MCNC: 115 MG/DL (ref 0–99)
LYMPHOCYTES NFR BLD AUTO: 1 /CMM (ref 0.8–4.8)
LYMPHOCYTES NFR BLD AUTO: 10.5 % (ref 20–44)
MAGNESIUM SERPL-MCNC: 1.6 MG/DL (ref 1.8–2.4)
MCHC RBC AUTO-ENTMCNC: 32 G/DL (ref 31–36)
MCV RBC AUTO: 87 FL (ref 82–100)
MONOCYTES NFR BLD AUTO: 0.7 /CMM (ref 0.1–1.3)
MONOCYTES NFR BLD AUTO: 7.9 % (ref 2–12)
NEUTROPHILS # BLD AUTO: 7.4 /CMM (ref 1.8–8.9)
NEUTROPHILS NFR BLD AUTO: 80.1 % (ref 43–81)
PCO2 TEMP ADJ BLDA: 70 MMHG (ref 35–45)
PH TEMP ADJ BLDA: 7.41 [PH] (ref 7.35–7.45)
PHOSPHATE SERPL-MCNC: 2.6 MG/DL (ref 2.5–4.9)
PLATELET # BLD AUTO: 179 /CMM (ref 150–450)
PO2 TEMP ADJ BLDA: 76.9 MMHG (ref 75–100)
POTASSIUM SERPL-SCNC: 3.6 MMOL/L (ref 3.5–5.1)
PROT SERPL-MCNC: 6.6 G/DL (ref 6.4–8.2)
RBC # BLD AUTO: 2.68 MIL/UL (ref 4–5.2)
RDW COEFFICIENT OF VARIATION: 19 (ref 11.5–15)
SAO2 % BLDA: 93 % (ref 92–98.5)
SODIUM SERPL-SCNC: 146 MMOL/L (ref 136–145)
TIBC SERPL-MCNC: 240 UG/DL (ref 250–450)
TRIGL SERPL-MCNC: 121 MG/DL (ref 30–150)
TROPONIN I SERPL-MCNC: 0.02 NG/ML (ref 0–0.06)
VENTILATION MODE VENT: (no result)
WBC NRBC COR # BLD AUTO: 9.2 K/UL (ref 4.3–11)

## 2018-05-26 RX ADMIN — INSULIN HUMAN PRN UNIT: 100 INJECTION, SOLUTION PARENTERAL at 12:21

## 2018-05-26 RX ADMIN — SODIUM CHLORIDE SCH MG: 9 INJECTION, SOLUTION INTRAVENOUS at 08:08

## 2018-05-26 RX ADMIN — CLOPIDOGREL BISULFATE SCH MG: 75 TABLET, FILM COATED ORAL at 08:47

## 2018-05-26 RX ADMIN — ALBUTEROL SULFATE SCH MG: 1.25 SOLUTION RESPIRATORY (INHALATION) at 07:35

## 2018-05-26 RX ADMIN — Medication SCH OZ: at 08:23

## 2018-05-26 RX ADMIN — MAGNESIUM SULFATE IN DEXTROSE SCH MLS/HR: 10 INJECTION, SOLUTION INTRAVENOUS at 09:09

## 2018-05-26 RX ADMIN — FOLIC ACID SCH MG: 1 TABLET ORAL at 08:50

## 2018-05-26 RX ADMIN — LOSARTAN POTASSIUM SCH MG: 50 TABLET, FILM COATED ORAL at 08:48

## 2018-05-26 RX ADMIN — ALBUTEROL SULFATE SCH MG: 1.25 SOLUTION RESPIRATORY (INHALATION) at 03:11

## 2018-05-26 RX ADMIN — Medication SCH EACH: at 17:25

## 2018-05-26 RX ADMIN — Medication SCH MG: at 08:47

## 2018-05-26 RX ADMIN — POTASSIUM CHLORIDE SCH MLS/HR: 200 INJECTION, SOLUTION INTRAVENOUS at 09:21

## 2018-05-26 RX ADMIN — ALBUTEROL SULFATE SCH MG: 1.25 SOLUTION RESPIRATORY (INHALATION) at 19:45

## 2018-05-26 RX ADMIN — SODIUM CHLORIDE SCH MG: 9 INJECTION, SOLUTION INTRAVENOUS at 15:14

## 2018-05-26 RX ADMIN — Medication SCH EACH: at 04:13

## 2018-05-26 RX ADMIN — ENOXAPARIN SODIUM SCH MG: 30 INJECTION SUBCUTANEOUS at 09:00

## 2018-05-26 RX ADMIN — MAGNESIUM SULFATE IN DEXTROSE SCH MLS/HR: 10 INJECTION, SOLUTION INTRAVENOUS at 10:29

## 2018-05-26 RX ADMIN — Medication SCH EACH: at 20:54

## 2018-05-26 RX ADMIN — MEROPENEM SCH MLS/HR: 500 INJECTION INTRAVENOUS at 20:37

## 2018-05-26 RX ADMIN — OXYCODONE HYDROCHLORIDE AND ACETAMINOPHEN SCH MG: 500 TABLET ORAL at 08:47

## 2018-05-26 RX ADMIN — Medication SCH EACH: at 01:14

## 2018-05-26 RX ADMIN — Medication SCH OZ: at 20:58

## 2018-05-26 RX ADMIN — MEROPENEM SCH MLS/HR: 500 INJECTION INTRAVENOUS at 08:22

## 2018-05-26 RX ADMIN — METOPROLOL TARTRATE SCH MG: 25 TABLET, FILM COATED ORAL at 08:48

## 2018-05-26 RX ADMIN — Medication SCH MG: at 11:30

## 2018-05-26 RX ADMIN — LEVOTHYROXINE SODIUM SCH MCG: 100 TABLET ORAL at 08:47

## 2018-05-26 RX ADMIN — Medication SCH MG: at 19:45

## 2018-05-26 RX ADMIN — SODIUM CHLORIDE SCH MG: 9 INJECTION, SOLUTION INTRAVENOUS at 12:11

## 2018-05-26 RX ADMIN — THERA TABS SCH UDTAB: TAB at 08:47

## 2018-05-26 RX ADMIN — Medication SCH MG: at 07:35

## 2018-05-26 RX ADMIN — ALBUTEROL SULFATE SCH MG: 1.25 SOLUTION RESPIRATORY (INHALATION) at 11:30

## 2018-05-26 RX ADMIN — Medication SCH MG: at 15:07

## 2018-05-26 RX ADMIN — POTASSIUM CHLORIDE SCH MLS/HR: 200 INJECTION, SOLUTION INTRAVENOUS at 08:07

## 2018-05-26 RX ADMIN — Medication SCH MG: at 03:11

## 2018-05-26 RX ADMIN — INSULIN HUMAN PRN UNIT: 100 INJECTION, SOLUTION PARENTERAL at 17:25

## 2018-05-26 RX ADMIN — Medication PRN OZ: at 20:54

## 2018-05-26 RX ADMIN — POTASSIUM CHLORIDE SCH MLS/HR: 200 INJECTION, SOLUTION INTRAVENOUS at 11:20

## 2018-05-26 RX ADMIN — METOPROLOL TARTRATE SCH MG: 25 TABLET, FILM COATED ORAL at 17:26

## 2018-05-26 RX ADMIN — POTASSIUM CHLORIDE SCH MLS/HR: 200 INJECTION, SOLUTION INTRAVENOUS at 10:07

## 2018-05-26 RX ADMIN — ALBUTEROL SULFATE SCH MG: 1.25 SOLUTION RESPIRATORY (INHALATION) at 15:07

## 2018-05-26 RX ADMIN — Medication SCH EACH: at 08:22

## 2018-05-26 RX ADMIN — Medication SCH EACH: at 12:11

## 2018-05-26 RX ADMIN — INSULIN HUMAN PRN UNIT: 100 INJECTION, SOLUTION PARENTERAL at 20:58

## 2018-05-27 VITALS — SYSTOLIC BLOOD PRESSURE: 138 MMHG | DIASTOLIC BLOOD PRESSURE: 74 MMHG

## 2018-05-27 VITALS — SYSTOLIC BLOOD PRESSURE: 86 MMHG | DIASTOLIC BLOOD PRESSURE: 76 MMHG

## 2018-05-27 VITALS — SYSTOLIC BLOOD PRESSURE: 107 MMHG | DIASTOLIC BLOOD PRESSURE: 39 MMHG

## 2018-05-27 VITALS — SYSTOLIC BLOOD PRESSURE: 138 MMHG | DIASTOLIC BLOOD PRESSURE: 76 MMHG

## 2018-05-27 VITALS — SYSTOLIC BLOOD PRESSURE: 116 MMHG | DIASTOLIC BLOOD PRESSURE: 66 MMHG

## 2018-05-27 VITALS — SYSTOLIC BLOOD PRESSURE: 88 MMHG | DIASTOLIC BLOOD PRESSURE: 41 MMHG

## 2018-05-27 VITALS — SYSTOLIC BLOOD PRESSURE: 82 MMHG | DIASTOLIC BLOOD PRESSURE: 39 MMHG

## 2018-05-27 VITALS — SYSTOLIC BLOOD PRESSURE: 87 MMHG | DIASTOLIC BLOOD PRESSURE: 35 MMHG

## 2018-05-27 VITALS — DIASTOLIC BLOOD PRESSURE: 65 MMHG | SYSTOLIC BLOOD PRESSURE: 124 MMHG

## 2018-05-27 VITALS — DIASTOLIC BLOOD PRESSURE: 55 MMHG | SYSTOLIC BLOOD PRESSURE: 130 MMHG

## 2018-05-27 VITALS — DIASTOLIC BLOOD PRESSURE: 72 MMHG | SYSTOLIC BLOOD PRESSURE: 136 MMHG

## 2018-05-27 VITALS — DIASTOLIC BLOOD PRESSURE: 67 MMHG | SYSTOLIC BLOOD PRESSURE: 131 MMHG

## 2018-05-27 VITALS — DIASTOLIC BLOOD PRESSURE: 54 MMHG | SYSTOLIC BLOOD PRESSURE: 110 MMHG

## 2018-05-27 VITALS — SYSTOLIC BLOOD PRESSURE: 123 MMHG | DIASTOLIC BLOOD PRESSURE: 67 MMHG

## 2018-05-27 VITALS — SYSTOLIC BLOOD PRESSURE: 128 MMHG | DIASTOLIC BLOOD PRESSURE: 65 MMHG

## 2018-05-27 VITALS — DIASTOLIC BLOOD PRESSURE: 59 MMHG | SYSTOLIC BLOOD PRESSURE: 114 MMHG

## 2018-05-27 VITALS — SYSTOLIC BLOOD PRESSURE: 145 MMHG | DIASTOLIC BLOOD PRESSURE: 74 MMHG

## 2018-05-27 VITALS — DIASTOLIC BLOOD PRESSURE: 56 MMHG | SYSTOLIC BLOOD PRESSURE: 114 MMHG

## 2018-05-27 VITALS — DIASTOLIC BLOOD PRESSURE: 63 MMHG | SYSTOLIC BLOOD PRESSURE: 118 MMHG

## 2018-05-27 VITALS — DIASTOLIC BLOOD PRESSURE: 50 MMHG | SYSTOLIC BLOOD PRESSURE: 112 MMHG

## 2018-05-27 VITALS — DIASTOLIC BLOOD PRESSURE: 79 MMHG | SYSTOLIC BLOOD PRESSURE: 125 MMHG

## 2018-05-27 VITALS — SYSTOLIC BLOOD PRESSURE: 99 MMHG | DIASTOLIC BLOOD PRESSURE: 54 MMHG

## 2018-05-27 VITALS — SYSTOLIC BLOOD PRESSURE: 103 MMHG | DIASTOLIC BLOOD PRESSURE: 69 MMHG

## 2018-05-27 VITALS — SYSTOLIC BLOOD PRESSURE: 131 MMHG | DIASTOLIC BLOOD PRESSURE: 67 MMHG

## 2018-05-27 VITALS — DIASTOLIC BLOOD PRESSURE: 67 MMHG | SYSTOLIC BLOOD PRESSURE: 127 MMHG

## 2018-05-27 VITALS — DIASTOLIC BLOOD PRESSURE: 57 MMHG | SYSTOLIC BLOOD PRESSURE: 126 MMHG

## 2018-05-27 VITALS — DIASTOLIC BLOOD PRESSURE: 54 MMHG | SYSTOLIC BLOOD PRESSURE: 99 MMHG

## 2018-05-27 LAB
ALBUMIN SERPL BCP-MCNC: 2.5 G/DL (ref 3.4–5)
ALP SERPL-CCNC: 73 U/L (ref 46–116)
ALT SERPL W P-5'-P-CCNC: 17 U/L (ref 12–78)
AST SERPL W P-5'-P-CCNC: 16 U/L (ref 15–37)
BASOPHILS # BLD AUTO: 0 /CMM (ref 0–0.2)
BASOPHILS NFR BLD AUTO: 0.1 % (ref 0–2)
BILIRUB SERPL-MCNC: 0.4 MG/DL (ref 0.2–1)
BUN SERPL-MCNC: 41 MG/DL (ref 7–18)
CALCIUM SERPL-MCNC: 8.1 MG/DL (ref 8.5–10.1)
CHLORIDE SERPL-SCNC: 101 MMOL/L (ref 98–107)
CO2 SERPL-SCNC: 42 MMOL/L (ref 21–32)
CREAT SERPL-MCNC: 1.1 MG/DL (ref 0.6–1.3)
EOSINOPHIL NFR BLD AUTO: 1.1 % (ref 0–6)
GLUCOSE SERPL-MCNC: 158 MG/DL (ref 74–106)
HCT VFR BLD AUTO: 24 % (ref 33–45)
HGB BLD-MCNC: 7.7 G/DL (ref 11.5–14.8)
LYMPHOCYTES NFR BLD AUTO: 1.5 /CMM (ref 0.8–4.8)
LYMPHOCYTES NFR BLD AUTO: 14.1 % (ref 20–44)
MAGNESIUM SERPL-MCNC: 1.7 MG/DL (ref 1.8–2.4)
MCHC RBC AUTO-ENTMCNC: 32 G/DL (ref 31–36)
MCV RBC AUTO: 88 FL (ref 82–100)
MONOCYTES NFR BLD AUTO: 0.8 /CMM (ref 0.1–1.3)
MONOCYTES NFR BLD AUTO: 7.9 % (ref 2–12)
NEUTROPHILS # BLD AUTO: 8 /CMM (ref 1.8–8.9)
NEUTROPHILS NFR BLD AUTO: 76.8 % (ref 43–81)
PLATELET # BLD AUTO: 195 /CMM (ref 150–450)
POTASSIUM SERPL-SCNC: 3 MMOL/L (ref 3.5–5.1)
PROT SERPL-MCNC: 6.5 G/DL (ref 6.4–8.2)
RBC # BLD AUTO: 2.73 MIL/UL (ref 4–5.2)
RDW COEFFICIENT OF VARIATION: 19.7 (ref 11.5–15)
SODIUM SERPL-SCNC: 143 MMOL/L (ref 136–145)
TROPONIN I SERPL-MCNC: 0.02 NG/ML (ref 0–0.06)
WBC NRBC COR # BLD AUTO: 10.4 K/UL (ref 4.3–11)

## 2018-05-27 RX ADMIN — Medication SCH EACH: at 05:32

## 2018-05-27 RX ADMIN — Medication SCH MG: at 19:34

## 2018-05-27 RX ADMIN — POTASSIUM CHLORIDE SCH MLS/HR: 200 INJECTION, SOLUTION INTRAVENOUS at 09:54

## 2018-05-27 RX ADMIN — Medication SCH EACH: at 17:01

## 2018-05-27 RX ADMIN — MAGNESIUM SULFATE IN DEXTROSE SCH MLS/HR: 10 INJECTION, SOLUTION INTRAVENOUS at 05:49

## 2018-05-27 RX ADMIN — POTASSIUM CHLORIDE SCH MLS/HR: 200 INJECTION, SOLUTION INTRAVENOUS at 08:52

## 2018-05-27 RX ADMIN — Medication SCH MG: at 08:14

## 2018-05-27 RX ADMIN — ALBUTEROL SULFATE SCH MG: 1.25 SOLUTION RESPIRATORY (INHALATION) at 11:01

## 2018-05-27 RX ADMIN — ALBUTEROL SULFATE SCH MG: 1.25 SOLUTION RESPIRATORY (INHALATION) at 00:30

## 2018-05-27 RX ADMIN — POTASSIUM CHLORIDE SCH MLS/HR: 200 INJECTION, SOLUTION INTRAVENOUS at 07:27

## 2018-05-27 RX ADMIN — Medication SCH EACH: at 02:25

## 2018-05-27 RX ADMIN — MAGNESIUM SULFATE IN DEXTROSE SCH MLS/HR: 10 INJECTION, SOLUTION INTRAVENOUS at 07:27

## 2018-05-27 RX ADMIN — POTASSIUM CHLORIDE SCH MEQ: 1.5 POWDER, FOR SOLUTION ORAL at 09:27

## 2018-05-27 RX ADMIN — Medication SCH MG: at 04:10

## 2018-05-27 RX ADMIN — Medication SCH MG: at 00:30

## 2018-05-27 RX ADMIN — FOLIC ACID SCH MG: 1 TABLET ORAL at 08:14

## 2018-05-27 RX ADMIN — POTASSIUM CHLORIDE SCH MLS/HR: 200 INJECTION, SOLUTION INTRAVENOUS at 06:07

## 2018-05-27 RX ADMIN — MEROPENEM SCH MLS/HR: 500 INJECTION INTRAVENOUS at 21:49

## 2018-05-27 RX ADMIN — ALBUTEROL SULFATE SCH MG: 1.25 SOLUTION RESPIRATORY (INHALATION) at 15:52

## 2018-05-27 RX ADMIN — LOSARTAN POTASSIUM SCH MG: 50 TABLET, FILM COATED ORAL at 08:16

## 2018-05-27 RX ADMIN — ALBUTEROL SULFATE SCH MG: 1.25 SOLUTION RESPIRATORY (INHALATION) at 19:34

## 2018-05-27 RX ADMIN — ALBUTEROL SULFATE SCH MG: 1.25 SOLUTION RESPIRATORY (INHALATION) at 07:51

## 2018-05-27 RX ADMIN — Medication SCH MG: at 15:52

## 2018-05-27 RX ADMIN — Medication SCH MG: at 23:45

## 2018-05-27 RX ADMIN — CLOPIDOGREL BISULFATE SCH MG: 75 TABLET, FILM COATED ORAL at 08:14

## 2018-05-27 RX ADMIN — Medication SCH OZ: at 09:27

## 2018-05-27 RX ADMIN — MAGNESIUM SULFATE IN DEXTROSE SCH MLS/HR: 10 INJECTION, SOLUTION INTRAVENOUS at 11:14

## 2018-05-27 RX ADMIN — METOPROLOL TARTRATE SCH MG: 25 TABLET, FILM COATED ORAL at 08:16

## 2018-05-27 RX ADMIN — METOPROLOL TARTRATE SCH MG: 25 TABLET, FILM COATED ORAL at 17:01

## 2018-05-27 RX ADMIN — INSULIN HUMAN PRN UNIT: 100 INJECTION, SOLUTION PARENTERAL at 13:00

## 2018-05-27 RX ADMIN — ALBUTEROL SULFATE SCH MG: 1.25 SOLUTION RESPIRATORY (INHALATION) at 23:45

## 2018-05-27 RX ADMIN — SODIUM CHLORIDE SCH MLS/HR: 9 INJECTION, SOLUTION INTRAVENOUS at 14:06

## 2018-05-27 RX ADMIN — INSULIN HUMAN PRN UNIT: 100 INJECTION, SOLUTION PARENTERAL at 05:34

## 2018-05-27 RX ADMIN — MUPIROCIN SCH APPLIC: 20 OINTMENT TOPICAL at 09:18

## 2018-05-27 RX ADMIN — INSULIN HUMAN PRN UNIT: 100 INJECTION, SOLUTION PARENTERAL at 18:00

## 2018-05-27 RX ADMIN — Medication SCH MG: at 07:51

## 2018-05-27 RX ADMIN — Medication SCH EACH: at 12:59

## 2018-05-27 RX ADMIN — MAGNESIUM SULFATE IN DEXTROSE SCH MLS/HR: 10 INJECTION, SOLUTION INTRAVENOUS at 09:27

## 2018-05-27 RX ADMIN — ENOXAPARIN SODIUM SCH MG: 30 INJECTION SUBCUTANEOUS at 08:15

## 2018-05-27 RX ADMIN — MEROPENEM SCH MLS/HR: 500 INJECTION INTRAVENOUS at 08:16

## 2018-05-27 RX ADMIN — MUPIROCIN SCH APPLIC: 20 OINTMENT TOPICAL at 21:50

## 2018-05-27 RX ADMIN — OXYCODONE HYDROCHLORIDE AND ACETAMINOPHEN SCH MG: 500 TABLET ORAL at 08:14

## 2018-05-27 RX ADMIN — INSULIN HUMAN PRN UNIT: 100 INJECTION, SOLUTION PARENTERAL at 22:05

## 2018-05-27 RX ADMIN — Medication SCH EACH: at 21:49

## 2018-05-27 RX ADMIN — THERA TABS SCH UDTAB: TAB at 09:03

## 2018-05-27 RX ADMIN — POTASSIUM CHLORIDE SCH MEQ: 1.5 POWDER, FOR SOLUTION ORAL at 11:14

## 2018-05-27 RX ADMIN — Medication SCH OZ: at 21:49

## 2018-05-27 RX ADMIN — POTASSIUM CHLORIDE SCH MEQ: 1.5 POWDER, FOR SOLUTION ORAL at 08:15

## 2018-05-27 RX ADMIN — Medication SCH MG: at 09:03

## 2018-05-27 RX ADMIN — Medication SCH MG: at 11:01

## 2018-05-27 RX ADMIN — INSULIN HUMAN PRN UNIT: 100 INJECTION, SOLUTION PARENTERAL at 08:31

## 2018-05-27 RX ADMIN — ALBUTEROL SULFATE SCH MG: 1.25 SOLUTION RESPIRATORY (INHALATION) at 04:10

## 2018-05-27 RX ADMIN — LEVOTHYROXINE SODIUM SCH MCG: 100 TABLET ORAL at 08:14

## 2018-05-27 RX ADMIN — Medication SCH EACH: at 08:52

## 2018-05-28 VITALS — SYSTOLIC BLOOD PRESSURE: 100 MMHG | DIASTOLIC BLOOD PRESSURE: 50 MMHG

## 2018-05-28 VITALS — DIASTOLIC BLOOD PRESSURE: 45 MMHG | SYSTOLIC BLOOD PRESSURE: 94 MMHG

## 2018-05-28 VITALS — DIASTOLIC BLOOD PRESSURE: 67 MMHG | SYSTOLIC BLOOD PRESSURE: 127 MMHG

## 2018-05-28 VITALS — SYSTOLIC BLOOD PRESSURE: 93 MMHG | DIASTOLIC BLOOD PRESSURE: 47 MMHG

## 2018-05-28 VITALS — SYSTOLIC BLOOD PRESSURE: 120 MMHG | DIASTOLIC BLOOD PRESSURE: 51 MMHG

## 2018-05-28 VITALS — SYSTOLIC BLOOD PRESSURE: 108 MMHG | DIASTOLIC BLOOD PRESSURE: 58 MMHG

## 2018-05-28 VITALS — SYSTOLIC BLOOD PRESSURE: 123 MMHG | DIASTOLIC BLOOD PRESSURE: 61 MMHG

## 2018-05-28 VITALS — SYSTOLIC BLOOD PRESSURE: 121 MMHG | DIASTOLIC BLOOD PRESSURE: 66 MMHG

## 2018-05-28 VITALS — DIASTOLIC BLOOD PRESSURE: 50 MMHG | SYSTOLIC BLOOD PRESSURE: 100 MMHG

## 2018-05-28 VITALS — SYSTOLIC BLOOD PRESSURE: 114 MMHG | DIASTOLIC BLOOD PRESSURE: 68 MMHG

## 2018-05-28 VITALS — DIASTOLIC BLOOD PRESSURE: 55 MMHG | SYSTOLIC BLOOD PRESSURE: 97 MMHG

## 2018-05-28 VITALS — SYSTOLIC BLOOD PRESSURE: 116 MMHG | DIASTOLIC BLOOD PRESSURE: 62 MMHG

## 2018-05-28 VITALS — DIASTOLIC BLOOD PRESSURE: 60 MMHG | SYSTOLIC BLOOD PRESSURE: 130 MMHG

## 2018-05-28 VITALS — SYSTOLIC BLOOD PRESSURE: 122 MMHG | DIASTOLIC BLOOD PRESSURE: 58 MMHG

## 2018-05-28 VITALS — DIASTOLIC BLOOD PRESSURE: 65 MMHG | SYSTOLIC BLOOD PRESSURE: 119 MMHG

## 2018-05-28 LAB
ALBUMIN SERPL BCP-MCNC: 2.7 G/DL (ref 3.4–5)
ALP SERPL-CCNC: 88 U/L (ref 46–116)
ALT SERPL W P-5'-P-CCNC: 18 U/L (ref 12–78)
AST SERPL W P-5'-P-CCNC: 18 U/L (ref 15–37)
BASE EXCESS BLDA CALC-SCNC: 11.4 MMOL/L
BASE EXCESS BLDA CALC-SCNC: 14.2 MMOL/L
BASE EXCESS BLDA CALC-SCNC: 16.1 MMOL/L
BASOPHILS # BLD AUTO: 0 /CMM (ref 0–0.2)
BASOPHILS NFR BLD AUTO: 0.1 % (ref 0–2)
BILIRUB SERPL-MCNC: 0.3 MG/DL (ref 0.2–1)
BUN SERPL-MCNC: 47 MG/DL (ref 7–18)
CALCIUM SERPL-MCNC: 8.5 MG/DL (ref 8.5–10.1)
CHLORIDE SERPL-SCNC: 100 MMOL/L (ref 98–107)
CO2 SERPL-SCNC: 42 MMOL/L (ref 21–32)
CREAT SERPL-MCNC: 1.3 MG/DL (ref 0.6–1.3)
DO-HGB MFR BLDA: 108.4 MMHG
DO-HGB MFR BLDA: 6.2 MMHG
DO-HGB MFR BLDA: 72.1 MMHG
EOSINOPHIL NFR BLD AUTO: 1.5 % (ref 0–6)
GLUCOSE SERPL-MCNC: 92 MG/DL (ref 74–106)
HCT VFR BLD AUTO: 27 % (ref 33–45)
HGB BLD-MCNC: 8.4 G/DL (ref 11.5–14.8)
INHALED O2 CONCENTRATION: 28 %
INHALED O2 CONCENTRATION: 40 %
INHALED O2 CONCENTRATION: 40 %
INTRINSIC PEEP RESPIRATORY: 5 CM H2O
INTRINSIC PEEP RESPIRATORY: 5 CM H2O
LYMPHOCYTES NFR BLD AUTO: 1.2 /CMM (ref 0.8–4.8)
LYMPHOCYTES NFR BLD AUTO: 11.7 % (ref 20–44)
MAGNESIUM SERPL-MCNC: 3.5 MG/DL (ref 1.8–2.4)
MCHC RBC AUTO-ENTMCNC: 32 G/DL (ref 31–36)
MCV RBC AUTO: 90 FL (ref 82–100)
MONOCYTES NFR BLD AUTO: 0.9 /CMM (ref 0.1–1.3)
MONOCYTES NFR BLD AUTO: 9 % (ref 2–12)
NEUTROPHILS # BLD AUTO: 8.1 /CMM (ref 1.8–8.9)
NEUTROPHILS NFR BLD AUTO: 77.7 % (ref 43–81)
PCO2 TEMP ADJ BLDA: 68.9 MMHG (ref 35–45)
PCO2 TEMP ADJ BLDA: 96.6 MMHG (ref 35–45)
PCO2 TEMP ADJ BLDA: 98.3 MMHG (ref 35–45)
PH TEMP ADJ BLDA: 7.27 [PH] (ref 7.35–7.45)
PH TEMP ADJ BLDA: 7.29 [PH] (ref 7.35–7.45)
PH TEMP ADJ BLDA: 7.37 [PH] (ref 7.35–7.45)
PHOSPHATE SERPL-MCNC: 4.6 MG/DL (ref 2.5–4.9)
PLATELET # BLD AUTO: 209 /CMM (ref 150–450)
PO2 TEMP ADJ BLDA: 102 MMHG (ref 75–100)
PO2 TEMP ADJ BLDA: 77.4 MMHG (ref 75–100)
PO2 TEMP ADJ BLDA: 97.6 MMHG (ref 75–100)
POTASSIUM SERPL-SCNC: 4 MMOL/L (ref 3.5–5.1)
PROT SERPL-MCNC: 7.2 G/DL (ref 6.4–8.2)
RBC # BLD AUTO: 2.98 MIL/UL (ref 4–5.2)
RDW COEFFICIENT OF VARIATION: 19.2 (ref 11.5–15)
SAO2 % BLDA: 92.5 % (ref 92–98.5)
SAO2 % BLDA: 96.3 % (ref 92–98.5)
SAO2 % BLDA: 96.3 % (ref 92–98.5)
SET RATE, BG: 24
SET RATE, BG: 24
SODIUM SERPL-SCNC: 143 MMOL/L (ref 136–145)
VENTILATION MODE VENT: (no result)
WBC NRBC COR # BLD AUTO: 10.4 K/UL (ref 4.3–11)

## 2018-05-28 PROCEDURE — 5A09457 ASSISTANCE WITH RESPIRATORY VENTILATION, 24-96 CONSECUTIVE HOURS, CONTINUOUS POSITIVE AIRWAY PRESSURE: ICD-10-PCS | Performed by: INTERNAL MEDICINE

## 2018-05-28 RX ADMIN — THERA TABS SCH UDTAB: TAB at 08:02

## 2018-05-28 RX ADMIN — ALBUTEROL SULFATE SCH MG: 1.25 SOLUTION RESPIRATORY (INHALATION) at 15:32

## 2018-05-28 RX ADMIN — MEROPENEM SCH MLS/HR: 500 INJECTION INTRAVENOUS at 21:21

## 2018-05-28 RX ADMIN — Medication SCH MG: at 08:06

## 2018-05-28 RX ADMIN — ALBUTEROL SULFATE SCH MG: 1.25 SOLUTION RESPIRATORY (INHALATION) at 08:06

## 2018-05-28 RX ADMIN — Medication SCH MG: at 15:32

## 2018-05-28 RX ADMIN — Medication SCH MG: at 11:15

## 2018-05-28 RX ADMIN — ALBUTEROL SULFATE SCH MG: 1.25 SOLUTION RESPIRATORY (INHALATION) at 03:56

## 2018-05-28 RX ADMIN — SODIUM CHLORIDE SCH MLS/HR: 9 INJECTION, SOLUTION INTRAVENOUS at 14:34

## 2018-05-28 RX ADMIN — Medication SCH EACH: at 05:30

## 2018-05-28 RX ADMIN — ALBUTEROL SULFATE SCH MG: 1.25 SOLUTION RESPIRATORY (INHALATION) at 11:15

## 2018-05-28 RX ADMIN — MUPIROCIN SCH APPLIC: 20 OINTMENT TOPICAL at 21:30

## 2018-05-28 RX ADMIN — FOLIC ACID SCH MG: 1 TABLET ORAL at 08:02

## 2018-05-28 RX ADMIN — INSULIN HUMAN PRN UNIT: 100 INJECTION, SOLUTION PARENTERAL at 12:14

## 2018-05-28 RX ADMIN — Medication SCH EACH: at 12:06

## 2018-05-28 RX ADMIN — ENOXAPARIN SODIUM SCH MG: 30 INJECTION SUBCUTANEOUS at 08:05

## 2018-05-28 RX ADMIN — Medication SCH MG: at 08:02

## 2018-05-28 RX ADMIN — METOPROLOL TARTRATE SCH MG: 25 TABLET, FILM COATED ORAL at 08:01

## 2018-05-28 RX ADMIN — Medication SCH OZ: at 21:36

## 2018-05-28 RX ADMIN — Medication SCH EACH: at 00:45

## 2018-05-28 RX ADMIN — MEROPENEM SCH MLS/HR: 500 INJECTION INTRAVENOUS at 08:03

## 2018-05-28 RX ADMIN — NITROFURANTOIN (MACROCRYSTALS) SCH MG: 50 CAPSULE ORAL at 17:19

## 2018-05-28 RX ADMIN — CLOPIDOGREL BISULFATE SCH MG: 75 TABLET, FILM COATED ORAL at 08:02

## 2018-05-28 RX ADMIN — OXYCODONE HYDROCHLORIDE AND ACETAMINOPHEN SCH MG: 500 TABLET ORAL at 08:01

## 2018-05-28 RX ADMIN — Medication SCH MG: at 19:57

## 2018-05-28 RX ADMIN — LEVOTHYROXINE SODIUM SCH MCG: 100 TABLET ORAL at 08:01

## 2018-05-28 RX ADMIN — Medication SCH EACH: at 21:29

## 2018-05-28 RX ADMIN — METOPROLOL TARTRATE SCH MG: 25 TABLET, FILM COATED ORAL at 16:16

## 2018-05-28 RX ADMIN — LOSARTAN POTASSIUM SCH MG: 50 TABLET, FILM COATED ORAL at 08:02

## 2018-05-28 RX ADMIN — Medication SCH EACH: at 08:03

## 2018-05-28 RX ADMIN — Medication SCH OZ: at 08:03

## 2018-05-28 RX ADMIN — Medication SCH EACH: at 17:19

## 2018-05-28 RX ADMIN — NITROFURANTOIN (MACROCRYSTALS) SCH MG: 50 CAPSULE ORAL at 13:03

## 2018-05-28 RX ADMIN — SODIUM CHLORIDE PRN MLS/HR: 9 INJECTION, SOLUTION INTRAVENOUS at 14:51

## 2018-05-28 RX ADMIN — MUPIROCIN SCH APPLIC: 20 OINTMENT TOPICAL at 08:06

## 2018-05-28 RX ADMIN — Medication PRN OZ: at 21:31

## 2018-05-28 RX ADMIN — ALBUTEROL SULFATE SCH MG: 1.25 SOLUTION RESPIRATORY (INHALATION) at 19:57

## 2018-05-28 RX ADMIN — Medication SCH MG: at 03:56

## 2018-05-28 RX ADMIN — Medication SCH MG: at 08:01

## 2018-05-29 VITALS — SYSTOLIC BLOOD PRESSURE: 114 MMHG | DIASTOLIC BLOOD PRESSURE: 63 MMHG

## 2018-05-29 VITALS — SYSTOLIC BLOOD PRESSURE: 147 MMHG | DIASTOLIC BLOOD PRESSURE: 74 MMHG

## 2018-05-29 VITALS — DIASTOLIC BLOOD PRESSURE: 73 MMHG | SYSTOLIC BLOOD PRESSURE: 147 MMHG

## 2018-05-29 VITALS — SYSTOLIC BLOOD PRESSURE: 120 MMHG | DIASTOLIC BLOOD PRESSURE: 61 MMHG

## 2018-05-29 VITALS — DIASTOLIC BLOOD PRESSURE: 69 MMHG | SYSTOLIC BLOOD PRESSURE: 130 MMHG

## 2018-05-29 VITALS — DIASTOLIC BLOOD PRESSURE: 71 MMHG | SYSTOLIC BLOOD PRESSURE: 137 MMHG

## 2018-05-29 VITALS — SYSTOLIC BLOOD PRESSURE: 135 MMHG | DIASTOLIC BLOOD PRESSURE: 71 MMHG

## 2018-05-29 VITALS — DIASTOLIC BLOOD PRESSURE: 74 MMHG | SYSTOLIC BLOOD PRESSURE: 140 MMHG

## 2018-05-29 VITALS — SYSTOLIC BLOOD PRESSURE: 130 MMHG | DIASTOLIC BLOOD PRESSURE: 69 MMHG

## 2018-05-29 VITALS — SYSTOLIC BLOOD PRESSURE: 134 MMHG | DIASTOLIC BLOOD PRESSURE: 58 MMHG

## 2018-05-29 VITALS — DIASTOLIC BLOOD PRESSURE: 55 MMHG | SYSTOLIC BLOOD PRESSURE: 108 MMHG

## 2018-05-29 VITALS — SYSTOLIC BLOOD PRESSURE: 135 MMHG | DIASTOLIC BLOOD PRESSURE: 64 MMHG

## 2018-05-29 VITALS — SYSTOLIC BLOOD PRESSURE: 140 MMHG | DIASTOLIC BLOOD PRESSURE: 74 MMHG

## 2018-05-29 VITALS — DIASTOLIC BLOOD PRESSURE: 68 MMHG | SYSTOLIC BLOOD PRESSURE: 143 MMHG

## 2018-05-29 VITALS — SYSTOLIC BLOOD PRESSURE: 132 MMHG | DIASTOLIC BLOOD PRESSURE: 71 MMHG

## 2018-05-29 VITALS — DIASTOLIC BLOOD PRESSURE: 57 MMHG | SYSTOLIC BLOOD PRESSURE: 124 MMHG

## 2018-05-29 VITALS — DIASTOLIC BLOOD PRESSURE: 68 MMHG | SYSTOLIC BLOOD PRESSURE: 114 MMHG

## 2018-05-29 VITALS — SYSTOLIC BLOOD PRESSURE: 103 MMHG | DIASTOLIC BLOOD PRESSURE: 53 MMHG

## 2018-05-29 VITALS — DIASTOLIC BLOOD PRESSURE: 73 MMHG | SYSTOLIC BLOOD PRESSURE: 152 MMHG

## 2018-05-29 VITALS — SYSTOLIC BLOOD PRESSURE: 100 MMHG | DIASTOLIC BLOOD PRESSURE: 77 MMHG

## 2018-05-29 VITALS — DIASTOLIC BLOOD PRESSURE: 53 MMHG | SYSTOLIC BLOOD PRESSURE: 115 MMHG

## 2018-05-29 VITALS — DIASTOLIC BLOOD PRESSURE: 51 MMHG | SYSTOLIC BLOOD PRESSURE: 103 MMHG

## 2018-05-29 VITALS — SYSTOLIC BLOOD PRESSURE: 141 MMHG | DIASTOLIC BLOOD PRESSURE: 75 MMHG

## 2018-05-29 VITALS — SYSTOLIC BLOOD PRESSURE: 94 MMHG | DIASTOLIC BLOOD PRESSURE: 65 MMHG

## 2018-05-29 VITALS — SYSTOLIC BLOOD PRESSURE: 120 MMHG | DIASTOLIC BLOOD PRESSURE: 65 MMHG

## 2018-05-29 VITALS — DIASTOLIC BLOOD PRESSURE: 77 MMHG | SYSTOLIC BLOOD PRESSURE: 100 MMHG

## 2018-05-29 VITALS — SYSTOLIC BLOOD PRESSURE: 115 MMHG | DIASTOLIC BLOOD PRESSURE: 53 MMHG

## 2018-05-29 VITALS — DIASTOLIC BLOOD PRESSURE: 73 MMHG | SYSTOLIC BLOOD PRESSURE: 130 MMHG

## 2018-05-29 LAB
ALBUMIN SERPL BCP-MCNC: 2.1 G/DL (ref 3.4–5)
ALP SERPL-CCNC: 73 U/L (ref 46–116)
ALT SERPL W P-5'-P-CCNC: 12 U/L (ref 12–78)
AST SERPL W P-5'-P-CCNC: 14 U/L (ref 15–37)
BASE EXCESS BLDA CALC-SCNC: 13.3 MMOL/L
BASOPHILS # BLD AUTO: 0 /CMM (ref 0–0.2)
BASOPHILS NFR BLD AUTO: 0.4 % (ref 0–2)
BILIRUB SERPL-MCNC: 0.3 MG/DL (ref 0.2–1)
BUN SERPL-MCNC: 43 MG/DL (ref 7–18)
CALCIUM SERPL-MCNC: 7.9 MG/DL (ref 8.5–10.1)
CHLORIDE SERPL-SCNC: 105 MMOL/L (ref 98–107)
CO2 SERPL-SCNC: 38 MMOL/L (ref 21–32)
CREAT SERPL-MCNC: 1.1 MG/DL (ref 0.6–1.3)
DO-HGB MFR BLDA: 59.5 MMHG
EOSINOPHIL NFR BLD AUTO: 2.1 % (ref 0–6)
GLUCOSE SERPL-MCNC: 94 MG/DL (ref 74–106)
HCT VFR BLD AUTO: 23 % (ref 33–45)
HGB BLD-MCNC: 7.2 G/DL (ref 11.5–14.8)
INHALED O2 CONCENTRATION: 32 %
LYMPHOCYTES NFR BLD AUTO: 1.1 /CMM (ref 0.8–4.8)
LYMPHOCYTES NFR BLD AUTO: 11.8 % (ref 20–44)
MAGNESIUM SERPL-MCNC: 2.1 MG/DL (ref 1.8–2.4)
MCHC RBC AUTO-ENTMCNC: 32 G/DL (ref 31–36)
MCV RBC AUTO: 89 FL (ref 82–100)
MONOCYTES NFR BLD AUTO: 0.7 /CMM (ref 0.1–1.3)
MONOCYTES NFR BLD AUTO: 7.6 % (ref 2–12)
NEUTROPHILS # BLD AUTO: 7 /CMM (ref 1.8–8.9)
NEUTROPHILS NFR BLD AUTO: 78.1 % (ref 43–81)
PCO2 TEMP ADJ BLDA: 65.4 MMHG (ref 35–45)
PH TEMP ADJ BLDA: 7.41 [PH] (ref 7.35–7.45)
PHOSPHATE SERPL-MCNC: 3.3 MG/DL (ref 2.5–4.9)
PLATELET # BLD AUTO: 183 /CMM (ref 150–450)
PO2 TEMP ADJ BLDA: 92.1 MMHG (ref 75–100)
POTASSIUM SERPL-SCNC: 3.1 MMOL/L (ref 3.5–5.1)
PROT SERPL-MCNC: 5.7 G/DL (ref 6.4–8.2)
RBC # BLD AUTO: 2.53 MIL/UL (ref 4–5.2)
RDW COEFFICIENT OF VARIATION: 19 (ref 11.5–15)
SAO2 % BLDA: 95.6 % (ref 92–98.5)
SODIUM SERPL-SCNC: 146 MMOL/L (ref 136–145)
VENTILATION MODE VENT: (no result)
WBC NRBC COR # BLD AUTO: 8.9 K/UL (ref 4.3–11)

## 2018-05-29 PROCEDURE — 0W9B3ZZ DRAINAGE OF LEFT PLEURAL CAVITY, PERCUTANEOUS APPROACH: ICD-10-PCS

## 2018-05-29 RX ADMIN — Medication SCH OZ: at 09:04

## 2018-05-29 RX ADMIN — Medication SCH EACH: at 05:11

## 2018-05-29 RX ADMIN — CLOPIDOGREL BISULFATE SCH MG: 75 TABLET, FILM COATED ORAL at 09:00

## 2018-05-29 RX ADMIN — INSULIN HUMAN PRN UNIT: 100 INJECTION, SOLUTION PARENTERAL at 12:06

## 2018-05-29 RX ADMIN — Medication SCH MG: at 00:14

## 2018-05-29 RX ADMIN — SODIUM CHLORIDE PRN MLS/HR: 9 INJECTION, SOLUTION INTRAVENOUS at 21:24

## 2018-05-29 RX ADMIN — Medication SCH MG: at 08:00

## 2018-05-29 RX ADMIN — INSULIN HUMAN PRN UNIT: 100 INJECTION, SOLUTION PARENTERAL at 17:17

## 2018-05-29 RX ADMIN — Medication SCH MG: at 16:00

## 2018-05-29 RX ADMIN — Medication SCH GM: at 16:54

## 2018-05-29 RX ADMIN — METOPROLOL TARTRATE SCH MG: 25 TABLET, FILM COATED ORAL at 16:55

## 2018-05-29 RX ADMIN — Medication SCH EACH: at 01:07

## 2018-05-29 RX ADMIN — LEVOTHYROXINE SODIUM SCH MCG: 100 TABLET ORAL at 07:48

## 2018-05-29 RX ADMIN — Medication SCH EACH: at 21:24

## 2018-05-29 RX ADMIN — Medication SCH MG: at 03:34

## 2018-05-29 RX ADMIN — ALBUTEROL SULFATE SCH MG: 1.25 SOLUTION RESPIRATORY (INHALATION) at 08:00

## 2018-05-29 RX ADMIN — Medication SCH MG: at 23:39

## 2018-05-29 RX ADMIN — Medication SCH EACH: at 08:44

## 2018-05-29 RX ADMIN — METOPROLOL TARTRATE SCH MG: 25 TABLET, FILM COATED ORAL at 09:03

## 2018-05-29 RX ADMIN — ALBUTEROL SULFATE SCH MG: 1.25 SOLUTION RESPIRATORY (INHALATION) at 20:03

## 2018-05-29 RX ADMIN — THERA TABS SCH UDTAB: TAB at 09:03

## 2018-05-29 RX ADMIN — Medication SCH EACH: at 16:55

## 2018-05-29 RX ADMIN — POTASSIUM CHLORIDE SCH MLS/HR: 200 INJECTION, SOLUTION INTRAVENOUS at 07:51

## 2018-05-29 RX ADMIN — Medication SCH EACH: at 11:36

## 2018-05-29 RX ADMIN — NITROFURANTOIN (MACROCRYSTALS) SCH MG: 50 CAPSULE ORAL at 16:54

## 2018-05-29 RX ADMIN — ALBUTEROL SULFATE SCH MG: 1.25 SOLUTION RESPIRATORY (INHALATION) at 11:42

## 2018-05-29 RX ADMIN — MEROPENEM SCH MLS/HR: 500 INJECTION INTRAVENOUS at 21:24

## 2018-05-29 RX ADMIN — NITROFURANTOIN (MACROCRYSTALS) SCH MG: 50 CAPSULE ORAL at 06:05

## 2018-05-29 RX ADMIN — POTASSIUM CHLORIDE SCH MLS/HR: 200 INJECTION, SOLUTION INTRAVENOUS at 08:50

## 2018-05-29 RX ADMIN — MUPIROCIN SCH APPLIC: 20 OINTMENT TOPICAL at 09:04

## 2018-05-29 RX ADMIN — ALBUTEROL SULFATE SCH MG: 1.25 SOLUTION RESPIRATORY (INHALATION) at 16:01

## 2018-05-29 RX ADMIN — MEROPENEM SCH MLS/HR: 500 INJECTION INTRAVENOUS at 10:01

## 2018-05-29 RX ADMIN — NITROFURANTOIN (MACROCRYSTALS) SCH MG: 50 CAPSULE ORAL at 00:00

## 2018-05-29 RX ADMIN — Medication SCH MG: at 09:03

## 2018-05-29 RX ADMIN — ALBUTEROL SULFATE SCH MG: 1.25 SOLUTION RESPIRATORY (INHALATION) at 23:39

## 2018-05-29 RX ADMIN — OXYCODONE HYDROCHLORIDE AND ACETAMINOPHEN SCH MG: 500 TABLET ORAL at 09:03

## 2018-05-29 RX ADMIN — ALBUTEROL SULFATE SCH MG: 1.25 SOLUTION RESPIRATORY (INHALATION) at 00:14

## 2018-05-29 RX ADMIN — NITROFURANTOIN (MACROCRYSTALS) SCH MG: 50 CAPSULE ORAL at 12:20

## 2018-05-29 RX ADMIN — POTASSIUM CHLORIDE SCH MLS/HR: 200 INJECTION, SOLUTION INTRAVENOUS at 11:57

## 2018-05-29 RX ADMIN — ALBUTEROL SULFATE SCH MG: 1.25 SOLUTION RESPIRATORY (INHALATION) at 03:34

## 2018-05-29 RX ADMIN — NITROFURANTOIN (MACROCRYSTALS) SCH MG: 50 CAPSULE ORAL at 01:04

## 2018-05-29 RX ADMIN — Medication SCH OZ: at 21:26

## 2018-05-29 RX ADMIN — SODIUM CHLORIDE SCH MLS/HR: 9 INJECTION, SOLUTION INTRAVENOUS at 14:09

## 2018-05-29 RX ADMIN — HUMAN INSULIN PRN UNIT: 100 INJECTION, SOLUTION SUBCUTANEOUS at 21:32

## 2018-05-29 RX ADMIN — LOSARTAN POTASSIUM SCH MG: 50 TABLET, FILM COATED ORAL at 09:03

## 2018-05-29 RX ADMIN — Medication SCH MG: at 20:03

## 2018-05-29 RX ADMIN — FOLIC ACID SCH MG: 1 TABLET ORAL at 09:03

## 2018-05-29 RX ADMIN — POTASSIUM CHLORIDE SCH MLS/HR: 200 INJECTION, SOLUTION INTRAVENOUS at 10:49

## 2018-05-29 RX ADMIN — MUPIROCIN SCH APPLIC: 20 OINTMENT TOPICAL at 21:26

## 2018-05-29 RX ADMIN — Medication SCH MG: at 11:42

## 2018-05-30 VITALS — SYSTOLIC BLOOD PRESSURE: 144 MMHG | DIASTOLIC BLOOD PRESSURE: 75 MMHG

## 2018-05-30 VITALS — DIASTOLIC BLOOD PRESSURE: 76 MMHG | SYSTOLIC BLOOD PRESSURE: 144 MMHG

## 2018-05-30 VITALS — SYSTOLIC BLOOD PRESSURE: 129 MMHG | DIASTOLIC BLOOD PRESSURE: 58 MMHG

## 2018-05-30 VITALS — SYSTOLIC BLOOD PRESSURE: 125 MMHG | DIASTOLIC BLOOD PRESSURE: 68 MMHG

## 2018-05-30 VITALS — SYSTOLIC BLOOD PRESSURE: 138 MMHG | DIASTOLIC BLOOD PRESSURE: 73 MMHG

## 2018-05-30 VITALS — SYSTOLIC BLOOD PRESSURE: 151 MMHG | DIASTOLIC BLOOD PRESSURE: 71 MMHG

## 2018-05-30 VITALS — SYSTOLIC BLOOD PRESSURE: 143 MMHG | DIASTOLIC BLOOD PRESSURE: 77 MMHG

## 2018-05-30 VITALS — SYSTOLIC BLOOD PRESSURE: 140 MMHG | DIASTOLIC BLOOD PRESSURE: 79 MMHG

## 2018-05-30 VITALS — DIASTOLIC BLOOD PRESSURE: 50 MMHG | SYSTOLIC BLOOD PRESSURE: 113 MMHG

## 2018-05-30 VITALS — SYSTOLIC BLOOD PRESSURE: 145 MMHG | DIASTOLIC BLOOD PRESSURE: 75 MMHG

## 2018-05-30 VITALS — DIASTOLIC BLOOD PRESSURE: 57 MMHG | SYSTOLIC BLOOD PRESSURE: 125 MMHG

## 2018-05-30 VITALS — SYSTOLIC BLOOD PRESSURE: 101 MMHG | DIASTOLIC BLOOD PRESSURE: 52 MMHG

## 2018-05-30 VITALS — SYSTOLIC BLOOD PRESSURE: 123 MMHG | DIASTOLIC BLOOD PRESSURE: 61 MMHG

## 2018-05-30 VITALS — DIASTOLIC BLOOD PRESSURE: 54 MMHG | SYSTOLIC BLOOD PRESSURE: 136 MMHG

## 2018-05-30 VITALS — DIASTOLIC BLOOD PRESSURE: 57 MMHG | SYSTOLIC BLOOD PRESSURE: 139 MMHG

## 2018-05-30 VITALS — DIASTOLIC BLOOD PRESSURE: 53 MMHG | SYSTOLIC BLOOD PRESSURE: 122 MMHG

## 2018-05-30 VITALS — SYSTOLIC BLOOD PRESSURE: 96 MMHG | DIASTOLIC BLOOD PRESSURE: 55 MMHG

## 2018-05-30 VITALS — DIASTOLIC BLOOD PRESSURE: 63 MMHG | SYSTOLIC BLOOD PRESSURE: 124 MMHG

## 2018-05-30 VITALS — DIASTOLIC BLOOD PRESSURE: 72 MMHG | SYSTOLIC BLOOD PRESSURE: 142 MMHG

## 2018-05-30 VITALS — DIASTOLIC BLOOD PRESSURE: 55 MMHG | SYSTOLIC BLOOD PRESSURE: 131 MMHG

## 2018-05-30 VITALS — SYSTOLIC BLOOD PRESSURE: 150 MMHG | DIASTOLIC BLOOD PRESSURE: 77 MMHG

## 2018-05-30 VITALS — DIASTOLIC BLOOD PRESSURE: 60 MMHG | SYSTOLIC BLOOD PRESSURE: 124 MMHG

## 2018-05-30 VITALS — SYSTOLIC BLOOD PRESSURE: 123 MMHG | DIASTOLIC BLOOD PRESSURE: 52 MMHG

## 2018-05-30 LAB
BASOPHILS # BLD AUTO: 0 /CMM (ref 0–0.2)
BASOPHILS NFR BLD AUTO: 0.3 % (ref 0–2)
BUN SERPL-MCNC: 44 MG/DL (ref 7–18)
CALCIUM SERPL-MCNC: 8.3 MG/DL (ref 8.5–10.1)
CHLORIDE SERPL-SCNC: 102 MMOL/L (ref 98–107)
CO2 SERPL-SCNC: 36 MMOL/L (ref 21–32)
CREAT SERPL-MCNC: 1.1 MG/DL (ref 0.6–1.3)
EOSINOPHIL NFR BLD AUTO: 2.3 % (ref 0–6)
GLUCOSE SERPL-MCNC: 86 MG/DL (ref 74–106)
HCT VFR BLD AUTO: 27 % (ref 33–45)
HGB BLD-MCNC: 9.2 G/DL (ref 11.5–14.8)
LYMPHOCYTES NFR BLD AUTO: 1.9 /CMM (ref 0.8–4.8)
LYMPHOCYTES NFR BLD AUTO: 15.1 % (ref 20–44)
MCHC RBC AUTO-ENTMCNC: 34 G/DL (ref 31–36)
MCV RBC AUTO: 87 FL (ref 82–100)
MONOCYTES NFR BLD AUTO: 1.2 /CMM (ref 0.1–1.3)
MONOCYTES NFR BLD AUTO: 9.7 % (ref 2–12)
NEUTROPHILS # BLD AUTO: 9.5 /CMM (ref 1.8–8.9)
NEUTROPHILS NFR BLD AUTO: 72.6 % (ref 43–81)
PLATELET # BLD AUTO: 215 /CMM (ref 150–450)
POTASSIUM SERPL-SCNC: 3.7 MMOL/L (ref 3.5–5.1)
RBC # BLD AUTO: 3.1 MIL/UL (ref 4–5.2)
RDW COEFFICIENT OF VARIATION: 18.6 (ref 11.5–15)
SODIUM SERPL-SCNC: 143 MMOL/L (ref 136–145)
WBC NRBC COR # BLD AUTO: 12.9 K/UL (ref 4.3–11)

## 2018-05-30 PROCEDURE — 0W993ZZ DRAINAGE OF RIGHT PLEURAL CAVITY, PERCUTANEOUS APPROACH: ICD-10-PCS | Performed by: RADIOLOGY

## 2018-05-30 RX ADMIN — ALBUTEROL SULFATE SCH MG: 1.25 SOLUTION RESPIRATORY (INHALATION) at 07:44

## 2018-05-30 RX ADMIN — THERA TABS SCH UDTAB: TAB at 08:58

## 2018-05-30 RX ADMIN — ALBUTEROL SULFATE SCH MG: 1.25 SOLUTION RESPIRATORY (INHALATION) at 15:18

## 2018-05-30 RX ADMIN — ALBUTEROL SULFATE SCH MG: 1.25 SOLUTION RESPIRATORY (INHALATION) at 20:02

## 2018-05-30 RX ADMIN — Medication SCH EACH: at 18:01

## 2018-05-30 RX ADMIN — Medication SCH MG: at 20:02

## 2018-05-30 RX ADMIN — METOPROLOL TARTRATE SCH MG: 25 TABLET, FILM COATED ORAL at 18:00

## 2018-05-30 RX ADMIN — OXYCODONE HYDROCHLORIDE AND ACETAMINOPHEN SCH MG: 500 TABLET ORAL at 08:58

## 2018-05-30 RX ADMIN — Medication SCH EACH: at 08:58

## 2018-05-30 RX ADMIN — Medication SCH MG: at 03:27

## 2018-05-30 RX ADMIN — Medication SCH MG: at 09:25

## 2018-05-30 RX ADMIN — INSULIN HUMAN PRN UNIT: 100 INJECTION, SOLUTION PARENTERAL at 11:59

## 2018-05-30 RX ADMIN — FOLIC ACID SCH MG: 1 TABLET ORAL at 08:58

## 2018-05-30 RX ADMIN — Medication SCH EACH: at 11:55

## 2018-05-30 RX ADMIN — SODIUM CHLORIDE SCH MLS/HR: 9 INJECTION, SOLUTION INTRAVENOUS at 15:17

## 2018-05-30 RX ADMIN — ALBUTEROL SULFATE SCH MG: 1.25 SOLUTION RESPIRATORY (INHALATION) at 11:41

## 2018-05-30 RX ADMIN — Medication SCH MG: at 07:44

## 2018-05-30 RX ADMIN — Medication SCH OZ: at 21:24

## 2018-05-30 RX ADMIN — HUMAN INSULIN PRN UNIT: 100 INJECTION, SOLUTION SUBCUTANEOUS at 21:26

## 2018-05-30 RX ADMIN — Medication SCH MG: at 15:18

## 2018-05-30 RX ADMIN — Medication SCH EACH: at 08:01

## 2018-05-30 RX ADMIN — ALBUTEROL SULFATE SCH MG: 1.25 SOLUTION RESPIRATORY (INHALATION) at 03:27

## 2018-05-30 RX ADMIN — Medication SCH OZ: at 08:05

## 2018-05-30 RX ADMIN — NITROFURANTOIN (MACROCRYSTALS) SCH MG: 50 CAPSULE ORAL at 06:21

## 2018-05-30 RX ADMIN — Medication SCH MG: at 11:41

## 2018-05-30 RX ADMIN — Medication SCH GM: at 08:05

## 2018-05-30 RX ADMIN — INSULIN HUMAN PRN UNIT: 100 INJECTION, SOLUTION PARENTERAL at 18:01

## 2018-05-30 RX ADMIN — Medication SCH MG: at 23:12

## 2018-05-30 RX ADMIN — NITROFURANTOIN (MACROCRYSTALS) SCH MG: 50 CAPSULE ORAL at 00:38

## 2018-05-30 RX ADMIN — MUPIROCIN SCH APPLIC: 20 OINTMENT TOPICAL at 21:23

## 2018-05-30 RX ADMIN — ALBUTEROL SULFATE SCH MG: 1.25 SOLUTION RESPIRATORY (INHALATION) at 23:12

## 2018-05-30 RX ADMIN — MEROPENEM SCH MLS/HR: 500 INJECTION INTRAVENOUS at 08:04

## 2018-05-30 RX ADMIN — CLOPIDOGREL BISULFATE SCH MG: 75 TABLET, FILM COATED ORAL at 08:58

## 2018-05-30 RX ADMIN — INSULIN HUMAN PRN UNIT: 100 INJECTION, SOLUTION PARENTERAL at 08:02

## 2018-05-30 RX ADMIN — LOSARTAN POTASSIUM SCH MG: 50 TABLET, FILM COATED ORAL at 08:01

## 2018-05-30 RX ADMIN — Medication SCH GM: at 17:59

## 2018-05-30 RX ADMIN — MUPIROCIN SCH APPLIC: 20 OINTMENT TOPICAL at 09:26

## 2018-05-30 RX ADMIN — Medication SCH EACH: at 17:59

## 2018-05-30 RX ADMIN — Medication SCH EACH: at 21:24

## 2018-05-30 RX ADMIN — METOPROLOL TARTRATE SCH MG: 25 TABLET, FILM COATED ORAL at 08:01

## 2018-05-30 RX ADMIN — Medication SCH MG: at 08:58

## 2018-05-30 RX ADMIN — LEVOTHYROXINE SODIUM SCH MCG: 100 TABLET ORAL at 08:58

## 2018-05-31 VITALS — SYSTOLIC BLOOD PRESSURE: 129 MMHG | DIASTOLIC BLOOD PRESSURE: 94 MMHG

## 2018-05-31 VITALS — SYSTOLIC BLOOD PRESSURE: 114 MMHG | DIASTOLIC BLOOD PRESSURE: 58 MMHG

## 2018-05-31 VITALS — DIASTOLIC BLOOD PRESSURE: 55 MMHG | SYSTOLIC BLOOD PRESSURE: 115 MMHG

## 2018-05-31 VITALS — DIASTOLIC BLOOD PRESSURE: 63 MMHG | SYSTOLIC BLOOD PRESSURE: 129 MMHG

## 2018-05-31 VITALS — SYSTOLIC BLOOD PRESSURE: 123 MMHG | DIASTOLIC BLOOD PRESSURE: 58 MMHG

## 2018-05-31 VITALS — DIASTOLIC BLOOD PRESSURE: 65 MMHG | SYSTOLIC BLOOD PRESSURE: 133 MMHG

## 2018-05-31 VITALS — SYSTOLIC BLOOD PRESSURE: 122 MMHG | DIASTOLIC BLOOD PRESSURE: 57 MMHG

## 2018-05-31 VITALS — SYSTOLIC BLOOD PRESSURE: 128 MMHG | DIASTOLIC BLOOD PRESSURE: 61 MMHG

## 2018-05-31 VITALS — SYSTOLIC BLOOD PRESSURE: 134 MMHG | DIASTOLIC BLOOD PRESSURE: 68 MMHG

## 2018-05-31 VITALS — DIASTOLIC BLOOD PRESSURE: 67 MMHG | SYSTOLIC BLOOD PRESSURE: 125 MMHG

## 2018-05-31 VITALS — SYSTOLIC BLOOD PRESSURE: 117 MMHG | DIASTOLIC BLOOD PRESSURE: 55 MMHG

## 2018-05-31 VITALS — DIASTOLIC BLOOD PRESSURE: 62 MMHG | SYSTOLIC BLOOD PRESSURE: 124 MMHG

## 2018-05-31 VITALS — DIASTOLIC BLOOD PRESSURE: 64 MMHG | SYSTOLIC BLOOD PRESSURE: 132 MMHG

## 2018-05-31 LAB
BASOPHILS # BLD AUTO: 0 /CMM (ref 0–0.2)
BASOPHILS NFR BLD AUTO: 0.3 % (ref 0–2)
BUN SERPL-MCNC: 38 MG/DL (ref 7–18)
CALCIUM SERPL-MCNC: 8.3 MG/DL (ref 8.5–10.1)
CHLORIDE SERPL-SCNC: 103 MMOL/L (ref 98–107)
CO2 SERPL-SCNC: 38 MMOL/L (ref 21–32)
CREAT SERPL-MCNC: 1 MG/DL (ref 0.6–1.3)
EOSINOPHIL NFR BLD AUTO: 1.5 % (ref 0–6)
GLUCOSE SERPL-MCNC: 69 MG/DL (ref 74–106)
HCT VFR BLD AUTO: 28 % (ref 33–45)
HGB BLD-MCNC: 8.9 G/DL (ref 11.5–14.8)
LYMPHOCYTES NFR BLD AUTO: 1.8 /CMM (ref 0.8–4.8)
LYMPHOCYTES NFR BLD AUTO: 12.9 % (ref 20–44)
MCHC RBC AUTO-ENTMCNC: 33 G/DL (ref 31–36)
MCV RBC AUTO: 88 FL (ref 82–100)
MONOCYTES NFR BLD AUTO: 1.3 /CMM (ref 0.1–1.3)
MONOCYTES NFR BLD AUTO: 9.6 % (ref 2–12)
NEUTROPHILS # BLD AUTO: 10.3 /CMM (ref 1.8–8.9)
NEUTROPHILS NFR BLD AUTO: 75.7 % (ref 43–81)
PLATELET # BLD AUTO: 236 /CMM (ref 150–450)
POTASSIUM SERPL-SCNC: 4.1 MMOL/L (ref 3.5–5.1)
RBC # BLD AUTO: 3.11 MIL/UL (ref 4–5.2)
RDW COEFFICIENT OF VARIATION: 19.4 (ref 11.5–15)
SODIUM SERPL-SCNC: 142 MMOL/L (ref 136–145)
WBC NRBC COR # BLD AUTO: 13.6 K/UL (ref 4.3–11)

## 2018-05-31 RX ADMIN — CLOPIDOGREL BISULFATE SCH MG: 75 TABLET, FILM COATED ORAL at 08:39

## 2018-05-31 RX ADMIN — HUMAN INSULIN PRN UNIT: 100 INJECTION, SOLUTION SUBCUTANEOUS at 23:32

## 2018-05-31 RX ADMIN — METOPROLOL TARTRATE SCH MG: 25 TABLET, FILM COATED ORAL at 16:40

## 2018-05-31 RX ADMIN — Medication SCH MG: at 15:58

## 2018-05-31 RX ADMIN — NITROFURANTOIN MONOHYDRATE AND NITROFURANTOIN, MACROCRYSTALLINE SCH MG: 75; 25 CAPSULE ORAL at 10:23

## 2018-05-31 RX ADMIN — METOPROLOL TARTRATE SCH MG: 25 TABLET, FILM COATED ORAL at 08:39

## 2018-05-31 RX ADMIN — OXYCODONE HYDROCHLORIDE AND ACETAMINOPHEN SCH MG: 500 TABLET ORAL at 08:39

## 2018-05-31 RX ADMIN — ALBUTEROL SULFATE SCH MG: 1.25 SOLUTION RESPIRATORY (INHALATION) at 23:25

## 2018-05-31 RX ADMIN — ALBUTEROL SULFATE SCH MG: 1.25 SOLUTION RESPIRATORY (INHALATION) at 11:42

## 2018-05-31 RX ADMIN — INSULIN HUMAN PRN UNIT: 100 INJECTION, SOLUTION PARENTERAL at 12:17

## 2018-05-31 RX ADMIN — Medication SCH EACH: at 16:39

## 2018-05-31 RX ADMIN — Medication SCH GM: at 16:40

## 2018-05-31 RX ADMIN — Medication PRN OZ: at 20:26

## 2018-05-31 RX ADMIN — ALBUTEROL SULFATE SCH MG: 1.25 SOLUTION RESPIRATORY (INHALATION) at 19:00

## 2018-05-31 RX ADMIN — Medication SCH EACH: at 22:00

## 2018-05-31 RX ADMIN — FOLIC ACID SCH MG: 1 TABLET ORAL at 08:39

## 2018-05-31 RX ADMIN — INSULIN HUMAN PRN UNIT: 100 INJECTION, SOLUTION PARENTERAL at 16:44

## 2018-05-31 RX ADMIN — Medication SCH MG: at 23:25

## 2018-05-31 RX ADMIN — ALBUTEROL SULFATE SCH MG: 1.25 SOLUTION RESPIRATORY (INHALATION) at 15:58

## 2018-05-31 RX ADMIN — THERA TABS SCH UDTAB: TAB at 08:39

## 2018-05-31 RX ADMIN — Medication SCH OZ: at 20:30

## 2018-05-31 RX ADMIN — Medication SCH ML: at 17:41

## 2018-05-31 RX ADMIN — Medication SCH MG: at 08:17

## 2018-05-31 RX ADMIN — Medication SCH MG: at 08:39

## 2018-05-31 RX ADMIN — Medication SCH GM: at 08:40

## 2018-05-31 RX ADMIN — Medication SCH EACH: at 16:43

## 2018-05-31 RX ADMIN — Medication SCH EACH: at 12:16

## 2018-05-31 RX ADMIN — Medication SCH EACH: at 07:36

## 2018-05-31 RX ADMIN — ALBUTEROL SULFATE SCH MG: 1.25 SOLUTION RESPIRATORY (INHALATION) at 08:17

## 2018-05-31 RX ADMIN — Medication SCH MG: at 19:00

## 2018-05-31 RX ADMIN — LOSARTAN POTASSIUM SCH MG: 50 TABLET, FILM COATED ORAL at 08:38

## 2018-05-31 RX ADMIN — Medication SCH MG: at 11:42

## 2018-05-31 RX ADMIN — ALBUTEROL SULFATE SCH MG: 1.25 SOLUTION RESPIRATORY (INHALATION) at 03:11

## 2018-05-31 RX ADMIN — SODIUM CHLORIDE SCH MLS/HR: 9 INJECTION, SOLUTION INTRAVENOUS at 14:02

## 2018-05-31 RX ADMIN — Medication SCH EACH: at 08:39

## 2018-05-31 RX ADMIN — Medication SCH MG: at 03:10

## 2018-05-31 RX ADMIN — MUPIROCIN SCH APPLIC: 20 OINTMENT TOPICAL at 08:39

## 2018-05-31 RX ADMIN — NITROFURANTOIN MONOHYDRATE AND NITROFURANTOIN, MACROCRYSTALLINE SCH MG: 75; 25 CAPSULE ORAL at 20:25

## 2018-05-31 RX ADMIN — Medication SCH OZ: at 08:40

## 2018-05-31 RX ADMIN — MUPIROCIN SCH APPLIC: 20 OINTMENT TOPICAL at 20:26

## 2018-05-31 RX ADMIN — LEVOTHYROXINE SODIUM SCH MCG: 100 TABLET ORAL at 08:38

## 2018-06-01 VITALS — DIASTOLIC BLOOD PRESSURE: 58 MMHG | SYSTOLIC BLOOD PRESSURE: 127 MMHG

## 2018-06-01 VITALS — DIASTOLIC BLOOD PRESSURE: 53 MMHG | SYSTOLIC BLOOD PRESSURE: 118 MMHG

## 2018-06-01 VITALS — SYSTOLIC BLOOD PRESSURE: 110 MMHG | DIASTOLIC BLOOD PRESSURE: 47 MMHG

## 2018-06-01 VITALS — DIASTOLIC BLOOD PRESSURE: 64 MMHG | SYSTOLIC BLOOD PRESSURE: 126 MMHG

## 2018-06-01 VITALS — SYSTOLIC BLOOD PRESSURE: 136 MMHG | DIASTOLIC BLOOD PRESSURE: 66 MMHG

## 2018-06-01 VITALS — DIASTOLIC BLOOD PRESSURE: 56 MMHG | SYSTOLIC BLOOD PRESSURE: 115 MMHG

## 2018-06-01 LAB
BASOPHILS # BLD AUTO: 0.1 /CMM (ref 0–0.2)
BASOPHILS NFR BLD AUTO: 0.5 % (ref 0–2)
BUN SERPL-MCNC: 34 MG/DL (ref 7–18)
CALCIUM SERPL-MCNC: 8 MG/DL (ref 8.5–10.1)
CHLORIDE SERPL-SCNC: 103 MMOL/L (ref 98–107)
CO2 SERPL-SCNC: 39 MMOL/L (ref 21–32)
CREAT SERPL-MCNC: 1 MG/DL (ref 0.6–1.3)
EOSINOPHIL NFR BLD AUTO: 2 % (ref 0–6)
GLUCOSE SERPL-MCNC: 159 MG/DL (ref 74–106)
HCT VFR BLD AUTO: 28 % (ref 33–45)
HGB BLD-MCNC: 8.9 G/DL (ref 11.5–14.8)
LYMPHOCYTES NFR BLD AUTO: 1.9 /CMM (ref 0.8–4.8)
LYMPHOCYTES NFR BLD AUTO: 16.3 % (ref 20–44)
MCHC RBC AUTO-ENTMCNC: 32 G/DL (ref 31–36)
MCV RBC AUTO: 90 FL (ref 82–100)
MONOCYTES NFR BLD AUTO: 1.2 /CMM (ref 0.1–1.3)
MONOCYTES NFR BLD AUTO: 10.4 % (ref 2–12)
NEUTROPHILS # BLD AUTO: 8.4 /CMM (ref 1.8–8.9)
NEUTROPHILS NFR BLD AUTO: 70.8 % (ref 43–81)
PLATELET # BLD AUTO: 228 /CMM (ref 150–450)
POTASSIUM SERPL-SCNC: 4.1 MMOL/L (ref 3.5–5.1)
RBC # BLD AUTO: 3.09 MIL/UL (ref 4–5.2)
RDW COEFFICIENT OF VARIATION: 19.9 (ref 11.5–15)
SODIUM SERPL-SCNC: 144 MMOL/L (ref 136–145)
WBC NRBC COR # BLD AUTO: 11.9 K/UL (ref 4.3–11)

## 2018-06-01 RX ADMIN — ALBUTEROL SULFATE SCH MG: 1.25 SOLUTION RESPIRATORY (INHALATION) at 10:48

## 2018-06-01 RX ADMIN — OXYCODONE HYDROCHLORIDE AND ACETAMINOPHEN SCH MG: 500 TABLET ORAL at 09:01

## 2018-06-01 RX ADMIN — LEVOTHYROXINE SODIUM SCH MCG: 100 TABLET ORAL at 09:02

## 2018-06-01 RX ADMIN — Medication SCH EACH: at 16:35

## 2018-06-01 RX ADMIN — Medication SCH EACH: at 21:37

## 2018-06-01 RX ADMIN — SODIUM CHLORIDE SCH MG: 9 INJECTION, SOLUTION INTRAVENOUS at 16:45

## 2018-06-01 RX ADMIN — Medication SCH EACH: at 11:18

## 2018-06-01 RX ADMIN — Medication PRN OZ: at 09:04

## 2018-06-01 RX ADMIN — ALBUTEROL SULFATE SCH MG: 1.25 SOLUTION RESPIRATORY (INHALATION) at 03:13

## 2018-06-01 RX ADMIN — METOLAZONE SCH MG: 2.5 TABLET ORAL at 09:01

## 2018-06-01 RX ADMIN — Medication SCH EACH: at 09:02

## 2018-06-01 RX ADMIN — METOPROLOL TARTRATE SCH MG: 25 TABLET, FILM COATED ORAL at 16:45

## 2018-06-01 RX ADMIN — Medication SCH MG: at 19:21

## 2018-06-01 RX ADMIN — Medication SCH GM: at 16:42

## 2018-06-01 RX ADMIN — Medication SCH GM: at 09:05

## 2018-06-01 RX ADMIN — Medication SCH MG: at 10:48

## 2018-06-01 RX ADMIN — CLOPIDOGREL BISULFATE SCH MG: 75 TABLET, FILM COATED ORAL at 09:01

## 2018-06-01 RX ADMIN — Medication SCH MG: at 15:17

## 2018-06-01 RX ADMIN — ALBUTEROL SULFATE SCH MG: 1.25 SOLUTION RESPIRATORY (INHALATION) at 15:17

## 2018-06-01 RX ADMIN — ALBUTEROL SULFATE SCH MG: 1.25 SOLUTION RESPIRATORY (INHALATION) at 23:08

## 2018-06-01 RX ADMIN — Medication SCH MG: at 23:08

## 2018-06-01 RX ADMIN — THERA TABS SCH UDTAB: TAB at 09:01

## 2018-06-01 RX ADMIN — Medication SCH ML: at 16:40

## 2018-06-01 RX ADMIN — INSULIN HUMAN PRN UNIT: 100 INJECTION, SOLUTION PARENTERAL at 06:45

## 2018-06-01 RX ADMIN — FOLIC ACID SCH MG: 1 TABLET ORAL at 09:02

## 2018-06-01 RX ADMIN — Medication SCH OZ: at 09:04

## 2018-06-01 RX ADMIN — MUPIROCIN SCH APPLIC: 20 OINTMENT TOPICAL at 09:04

## 2018-06-01 RX ADMIN — SODIUM CHLORIDE SCH MG: 9 INJECTION, SOLUTION INTRAVENOUS at 12:20

## 2018-06-01 RX ADMIN — METOPROLOL TARTRATE SCH MG: 25 TABLET, FILM COATED ORAL at 09:02

## 2018-06-01 RX ADMIN — NITROFURANTOIN MONOHYDRATE AND NITROFURANTOIN, MACROCRYSTALLINE SCH MG: 75; 25 CAPSULE ORAL at 09:01

## 2018-06-01 RX ADMIN — Medication SCH MG: at 09:02

## 2018-06-01 RX ADMIN — INSULIN HUMAN PRN UNIT: 100 INJECTION, SOLUTION PARENTERAL at 16:50

## 2018-06-01 RX ADMIN — Medication SCH MG: at 03:13

## 2018-06-01 RX ADMIN — Medication SCH MG: at 07:47

## 2018-06-01 RX ADMIN — Medication SCH MG: at 09:01

## 2018-06-01 RX ADMIN — Medication SCH ML: at 09:35

## 2018-06-01 RX ADMIN — ALBUTEROL SULFATE SCH MG: 1.25 SOLUTION RESPIRATORY (INHALATION) at 19:21

## 2018-06-01 RX ADMIN — INSULIN HUMAN PRN UNIT: 100 INJECTION, SOLUTION PARENTERAL at 11:24

## 2018-06-01 RX ADMIN — Medication PRN OZ: at 21:33

## 2018-06-01 RX ADMIN — SODIUM CHLORIDE SCH MG: 9 INJECTION, SOLUTION INTRAVENOUS at 09:01

## 2018-06-01 RX ADMIN — Medication SCH EACH: at 16:45

## 2018-06-01 RX ADMIN — Medication SCH EACH: at 06:45

## 2018-06-01 RX ADMIN — MUPIROCIN SCH APPLIC: 20 OINTMENT TOPICAL at 21:33

## 2018-06-01 RX ADMIN — ALBUTEROL SULFATE SCH MG: 1.25 SOLUTION RESPIRATORY (INHALATION) at 07:47

## 2018-06-01 RX ADMIN — LOSARTAN POTASSIUM SCH MG: 50 TABLET, FILM COATED ORAL at 09:03

## 2018-06-01 RX ADMIN — INSULIN HUMAN PRN UNIT: 100 INJECTION, SOLUTION PARENTERAL at 22:13

## 2018-06-01 RX ADMIN — NITROFURANTOIN MONOHYDRATE AND NITROFURANTOIN, MACROCRYSTALLINE SCH MG: 75; 25 CAPSULE ORAL at 21:31

## 2018-06-02 VITALS — SYSTOLIC BLOOD PRESSURE: 120 MMHG | DIASTOLIC BLOOD PRESSURE: 50 MMHG

## 2018-06-02 VITALS — SYSTOLIC BLOOD PRESSURE: 121 MMHG | DIASTOLIC BLOOD PRESSURE: 61 MMHG

## 2018-06-02 VITALS — SYSTOLIC BLOOD PRESSURE: 119 MMHG | DIASTOLIC BLOOD PRESSURE: 59 MMHG

## 2018-06-02 VITALS — SYSTOLIC BLOOD PRESSURE: 124 MMHG | DIASTOLIC BLOOD PRESSURE: 70 MMHG

## 2018-06-02 VITALS — SYSTOLIC BLOOD PRESSURE: 131 MMHG | DIASTOLIC BLOOD PRESSURE: 65 MMHG

## 2018-06-02 VITALS — DIASTOLIC BLOOD PRESSURE: 64 MMHG | SYSTOLIC BLOOD PRESSURE: 123 MMHG

## 2018-06-02 LAB
ALBUMIN SERPL BCP-MCNC: 2.5 G/DL (ref 3.4–5)
ALP SERPL-CCNC: 90 U/L (ref 46–116)
ALT SERPL W P-5'-P-CCNC: 15 U/L (ref 12–78)
AST SERPL W P-5'-P-CCNC: 9 U/L (ref 15–37)
BASOPHILS # BLD AUTO: 0 /CMM (ref 0–0.2)
BASOPHILS NFR BLD AUTO: 0.2 % (ref 0–2)
BILIRUB SERPL-MCNC: 0.4 MG/DL (ref 0.2–1)
BUN SERPL-MCNC: 37 MG/DL (ref 7–18)
CALCIUM SERPL-MCNC: 8.5 MG/DL (ref 8.5–10.1)
CHLORIDE SERPL-SCNC: 95 MMOL/L (ref 98–107)
CO2 SERPL-SCNC: 43 MMOL/L (ref 21–32)
CREAT SERPL-MCNC: 1 MG/DL (ref 0.6–1.3)
EOSINOPHIL NFR BLD AUTO: 2.2 % (ref 0–6)
GLUCOSE SERPL-MCNC: 193 MG/DL (ref 74–106)
HCT VFR BLD AUTO: 28 % (ref 33–45)
HGB BLD-MCNC: 9.1 G/DL (ref 11.5–14.8)
LYMPHOCYTES NFR BLD AUTO: 1.8 /CMM (ref 0.8–4.8)
LYMPHOCYTES NFR BLD AUTO: 15.3 % (ref 20–44)
MAGNESIUM SERPL-MCNC: 1.6 MG/DL (ref 1.8–2.4)
MCHC RBC AUTO-ENTMCNC: 32 G/DL (ref 31–36)
MCV RBC AUTO: 90 FL (ref 82–100)
MONOCYTES NFR BLD AUTO: 1.1 /CMM (ref 0.1–1.3)
MONOCYTES NFR BLD AUTO: 9.2 % (ref 2–12)
NEUTROPHILS # BLD AUTO: 8.5 /CMM (ref 1.8–8.9)
NEUTROPHILS NFR BLD AUTO: 73.1 % (ref 43–81)
PHOSPHATE SERPL-MCNC: 2.7 MG/DL (ref 2.5–4.9)
PLATELET # BLD AUTO: 242 /CMM (ref 150–450)
POTASSIUM SERPL-SCNC: 3.5 MMOL/L (ref 3.5–5.1)
PROT SERPL-MCNC: 7 G/DL (ref 6.4–8.2)
RBC # BLD AUTO: 3.14 MIL/UL (ref 4–5.2)
RDW COEFFICIENT OF VARIATION: 21.2 (ref 11.5–15)
SODIUM SERPL-SCNC: 141 MMOL/L (ref 136–145)
TROPONIN I SERPL-MCNC: 0.02 NG/ML (ref 0–0.06)
WBC NRBC COR # BLD AUTO: 11.6 K/UL (ref 4.3–11)

## 2018-06-02 RX ADMIN — Medication SCH ML: at 17:00

## 2018-06-02 RX ADMIN — Medication SCH EACH: at 09:16

## 2018-06-02 RX ADMIN — Medication SCH MG: at 11:30

## 2018-06-02 RX ADMIN — LEVOTHYROXINE SODIUM SCH MCG: 100 TABLET ORAL at 09:16

## 2018-06-02 RX ADMIN — Medication SCH EACH: at 22:27

## 2018-06-02 RX ADMIN — METOPROLOL TARTRATE SCH MG: 25 TABLET, FILM COATED ORAL at 09:15

## 2018-06-02 RX ADMIN — FOLIC ACID SCH MG: 1 TABLET ORAL at 09:15

## 2018-06-02 RX ADMIN — ALBUTEROL SULFATE SCH MG: 1.25 SOLUTION RESPIRATORY (INHALATION) at 16:00

## 2018-06-02 RX ADMIN — ALBUTEROL SULFATE SCH MG: 1.25 SOLUTION RESPIRATORY (INHALATION) at 19:28

## 2018-06-02 RX ADMIN — METOLAZONE SCH MG: 2.5 TABLET ORAL at 09:16

## 2018-06-02 RX ADMIN — Medication SCH EACH: at 11:54

## 2018-06-02 RX ADMIN — ALBUTEROL SULFATE SCH MG: 1.25 SOLUTION RESPIRATORY (INHALATION) at 11:30

## 2018-06-02 RX ADMIN — Medication SCH MG: at 07:35

## 2018-06-02 RX ADMIN — ALBUTEROL SULFATE SCH MG: 1.25 SOLUTION RESPIRATORY (INHALATION) at 03:26

## 2018-06-02 RX ADMIN — Medication SCH EACH: at 17:30

## 2018-06-02 RX ADMIN — MUPIROCIN SCH APPLIC: 20 OINTMENT TOPICAL at 09:17

## 2018-06-02 RX ADMIN — Medication PRN OZ: at 09:18

## 2018-06-02 RX ADMIN — CLOPIDOGREL BISULFATE SCH MG: 75 TABLET, FILM COATED ORAL at 09:15

## 2018-06-02 RX ADMIN — INSULIN HUMAN PRN UNIT: 100 INJECTION, SOLUTION PARENTERAL at 09:23

## 2018-06-02 RX ADMIN — NITROFURANTOIN MONOHYDRATE AND NITROFURANTOIN, MACROCRYSTALLINE SCH MG: 75; 25 CAPSULE ORAL at 22:20

## 2018-06-02 RX ADMIN — HUMAN INSULIN PRN UNIT: 100 INJECTION, SOLUTION SUBCUTANEOUS at 22:25

## 2018-06-02 RX ADMIN — LOSARTAN POTASSIUM SCH MG: 50 TABLET, FILM COATED ORAL at 09:16

## 2018-06-02 RX ADMIN — Medication SCH EACH: at 17:29

## 2018-06-02 RX ADMIN — Medication SCH MG: at 16:00

## 2018-06-02 RX ADMIN — NITROFURANTOIN MONOHYDRATE AND NITROFURANTOIN, MACROCRYSTALLINE SCH MG: 75; 25 CAPSULE ORAL at 09:15

## 2018-06-02 RX ADMIN — METOPROLOL TARTRATE SCH MG: 25 TABLET, FILM COATED ORAL at 17:30

## 2018-06-02 RX ADMIN — THERA TABS SCH UDTAB: TAB at 09:15

## 2018-06-02 RX ADMIN — ALBUTEROL SULFATE SCH MG: 1.25 SOLUTION RESPIRATORY (INHALATION) at 07:35

## 2018-06-02 RX ADMIN — Medication SCH MG: at 03:26

## 2018-06-02 RX ADMIN — Medication SCH MG: at 09:15

## 2018-06-02 RX ADMIN — Medication SCH GM: at 09:17

## 2018-06-02 RX ADMIN — Medication SCH GM: at 17:29

## 2018-06-02 RX ADMIN — MUPIROCIN SCH APPLIC: 20 OINTMENT TOPICAL at 22:20

## 2018-06-02 RX ADMIN — INSULIN HUMAN PRN UNIT: 100 INJECTION, SOLUTION PARENTERAL at 11:54

## 2018-06-02 RX ADMIN — OXYCODONE HYDROCHLORIDE AND ACETAMINOPHEN SCH MG: 500 TABLET ORAL at 09:15

## 2018-06-02 RX ADMIN — Medication SCH EACH: at 08:24

## 2018-06-02 RX ADMIN — Medication SCH MG: at 19:28

## 2018-06-02 RX ADMIN — INSULIN HUMAN PRN UNIT: 100 INJECTION, SOLUTION PARENTERAL at 17:31

## 2018-06-02 RX ADMIN — Medication SCH ML: at 08:29

## 2018-06-03 VITALS — DIASTOLIC BLOOD PRESSURE: 50 MMHG | SYSTOLIC BLOOD PRESSURE: 119 MMHG

## 2018-06-03 VITALS — SYSTOLIC BLOOD PRESSURE: 132 MMHG | DIASTOLIC BLOOD PRESSURE: 61 MMHG

## 2018-06-03 VITALS — SYSTOLIC BLOOD PRESSURE: 111 MMHG | DIASTOLIC BLOOD PRESSURE: 52 MMHG

## 2018-06-03 VITALS — SYSTOLIC BLOOD PRESSURE: 99 MMHG | DIASTOLIC BLOOD PRESSURE: 58 MMHG

## 2018-06-03 VITALS — DIASTOLIC BLOOD PRESSURE: 63 MMHG | SYSTOLIC BLOOD PRESSURE: 112 MMHG

## 2018-06-03 VITALS — DIASTOLIC BLOOD PRESSURE: 64 MMHG | SYSTOLIC BLOOD PRESSURE: 127 MMHG

## 2018-06-03 LAB
BASE EXCESS BLDA CALC-SCNC: 14.5 MMOL/L
BASOPHILS # BLD AUTO: 0.1 /CMM (ref 0–0.2)
BASOPHILS NFR BLD AUTO: 0.5 % (ref 0–2)
BUN SERPL-MCNC: 31 MG/DL (ref 7–18)
CALCIUM SERPL-MCNC: 8.8 MG/DL (ref 8.5–10.1)
CHLORIDE SERPL-SCNC: 98 MMOL/L (ref 98–107)
CO2 SERPL-SCNC: 38 MMOL/L (ref 21–32)
CREAT SERPL-MCNC: 0.9 MG/DL (ref 0.6–1.3)
DO-HGB MFR BLDA: 0.2 MMHG
EOSINOPHIL NFR BLD AUTO: 2.3 % (ref 0–6)
GLUCOSE SERPL-MCNC: 153 MG/DL (ref 74–106)
HCT VFR BLD AUTO: 30 % (ref 33–45)
HGB BLD-MCNC: 9.6 G/DL (ref 11.5–14.8)
INHALED O2 CONCENTRATION: 28 %
INHALED O2 FLOW RATE: 2 L/MIN (ref 0–30)
LYMPHOCYTES NFR BLD AUTO: 18.3 % (ref 20–44)
LYMPHOCYTES NFR BLD AUTO: 2.1 /CMM (ref 0.8–4.8)
MCHC RBC AUTO-ENTMCNC: 32 G/DL (ref 31–36)
MCV RBC AUTO: 90 FL (ref 82–100)
MONOCYTES NFR BLD AUTO: 1.2 /CMM (ref 0.1–1.3)
MONOCYTES NFR BLD AUTO: 10.3 % (ref 2–12)
NEUTROPHILS # BLD AUTO: 7.8 /CMM (ref 1.8–8.9)
NEUTROPHILS NFR BLD AUTO: 68.6 % (ref 43–81)
PCO2 TEMP ADJ BLDA: 56.9 MMHG (ref 35–45)
PH TEMP ADJ BLDA: 7.47 [PH] (ref 7.35–7.45)
PLATELET # BLD AUTO: 235 /CMM (ref 150–450)
PO2 TEMP ADJ BLDA: 132.3 MMHG (ref 75–100)
POTASSIUM SERPL-SCNC: 3.5 MMOL/L (ref 3.5–5.1)
RBC # BLD AUTO: 3.36 MIL/UL (ref 4–5.2)
RDW COEFFICIENT OF VARIATION: 21.2 (ref 11.5–15)
SAO2 % BLDA: 98.1 % (ref 92–98.5)
SODIUM SERPL-SCNC: 141 MMOL/L (ref 136–145)
WBC NRBC COR # BLD AUTO: 11.3 K/UL (ref 4.3–11)

## 2018-06-03 RX ADMIN — OXYCODONE HYDROCHLORIDE AND ACETAMINOPHEN SCH MG: 500 TABLET ORAL at 08:05

## 2018-06-03 RX ADMIN — ALBUTEROL SULFATE SCH MG: 1.25 SOLUTION RESPIRATORY (INHALATION) at 23:31

## 2018-06-03 RX ADMIN — ALBUTEROL SULFATE SCH MG: 1.25 SOLUTION RESPIRATORY (INHALATION) at 07:35

## 2018-06-03 RX ADMIN — NITROFURANTOIN MONOHYDRATE AND NITROFURANTOIN, MACROCRYSTALLINE SCH MG: 75; 25 CAPSULE ORAL at 08:05

## 2018-06-03 RX ADMIN — Medication SCH MG: at 07:35

## 2018-06-03 RX ADMIN — ALBUTEROL SULFATE SCH MG: 1.25 SOLUTION RESPIRATORY (INHALATION) at 11:30

## 2018-06-03 RX ADMIN — NITROFURANTOIN MONOHYDRATE AND NITROFURANTOIN, MACROCRYSTALLINE SCH MG: 75; 25 CAPSULE ORAL at 21:57

## 2018-06-03 RX ADMIN — Medication SCH MG: at 23:31

## 2018-06-03 RX ADMIN — Medication SCH MG: at 08:06

## 2018-06-03 RX ADMIN — Medication SCH EACH: at 17:34

## 2018-06-03 RX ADMIN — INSULIN HUMAN PRN UNIT: 100 INJECTION, SOLUTION PARENTERAL at 12:05

## 2018-06-03 RX ADMIN — Medication SCH GM: at 17:36

## 2018-06-03 RX ADMIN — Medication SCH GM: at 08:14

## 2018-06-03 RX ADMIN — INSULIN HUMAN PRN UNIT: 100 INJECTION, SOLUTION PARENTERAL at 17:40

## 2018-06-03 RX ADMIN — Medication SCH MG: at 08:05

## 2018-06-03 RX ADMIN — HUMAN INSULIN PRN UNIT: 100 INJECTION, SOLUTION SUBCUTANEOUS at 21:45

## 2018-06-03 RX ADMIN — ALBUTEROL SULFATE SCH MG: 1.25 SOLUTION RESPIRATORY (INHALATION) at 03:37

## 2018-06-03 RX ADMIN — THERA TABS SCH UDTAB: TAB at 08:04

## 2018-06-03 RX ADMIN — Medication SCH MG: at 03:37

## 2018-06-03 RX ADMIN — Medication SCH EACH: at 21:39

## 2018-06-03 RX ADMIN — POTASSIUM CHLORIDE SCH MEQ: 1.5 POWDER, FOR SOLUTION ORAL at 12:04

## 2018-06-03 RX ADMIN — LOSARTAN POTASSIUM SCH MG: 50 TABLET, FILM COATED ORAL at 08:06

## 2018-06-03 RX ADMIN — MUPIROCIN SCH APPLIC: 20 OINTMENT TOPICAL at 21:56

## 2018-06-03 RX ADMIN — Medication SCH EACH: at 08:12

## 2018-06-03 RX ADMIN — ALBUTEROL SULFATE SCH MG: 1.25 SOLUTION RESPIRATORY (INHALATION) at 00:05

## 2018-06-03 RX ADMIN — FOLIC ACID SCH MG: 1 TABLET ORAL at 08:05

## 2018-06-03 RX ADMIN — MUPIROCIN SCH APPLIC: 20 OINTMENT TOPICAL at 08:14

## 2018-06-03 RX ADMIN — ALBUTEROL SULFATE SCH MG: 1.25 SOLUTION RESPIRATORY (INHALATION) at 16:37

## 2018-06-03 RX ADMIN — Medication SCH ML: at 17:00

## 2018-06-03 RX ADMIN — Medication SCH MG: at 16:37

## 2018-06-03 RX ADMIN — POTASSIUM CHLORIDE SCH MEQ: 1.5 POWDER, FOR SOLUTION ORAL at 11:07

## 2018-06-03 RX ADMIN — Medication SCH MG: at 00:05

## 2018-06-03 RX ADMIN — ACETAMINOPHEN PRN MG: 325 TABLET ORAL at 12:49

## 2018-06-03 RX ADMIN — ALBUTEROL SULFATE SCH MG: 1.25 SOLUTION RESPIRATORY (INHALATION) at 19:48

## 2018-06-03 RX ADMIN — INSULIN HUMAN PRN UNIT: 100 INJECTION, SOLUTION PARENTERAL at 08:08

## 2018-06-03 RX ADMIN — Medication SCH EACH: at 12:04

## 2018-06-03 RX ADMIN — Medication SCH EACH: at 08:01

## 2018-06-03 RX ADMIN — Medication SCH MG: at 19:48

## 2018-06-03 RX ADMIN — Medication SCH MG: at 11:30

## 2018-06-03 RX ADMIN — METOPROLOL TARTRATE SCH MG: 25 TABLET, FILM COATED ORAL at 17:34

## 2018-06-03 RX ADMIN — Medication SCH ML: at 08:03

## 2018-06-03 RX ADMIN — LEVOTHYROXINE SODIUM SCH MCG: 100 TABLET ORAL at 08:01

## 2018-06-03 RX ADMIN — METOPROLOL TARTRATE SCH MG: 25 TABLET, FILM COATED ORAL at 08:06

## 2018-06-03 RX ADMIN — ALBUTEROL SULFATE SCH MG: 1.25 SOLUTION RESPIRATORY (INHALATION) at 11:39

## 2018-06-03 RX ADMIN — CLOPIDOGREL BISULFATE SCH MG: 75 TABLET, FILM COATED ORAL at 08:05

## 2018-06-03 RX ADMIN — Medication SCH MG: at 11:39

## 2018-06-04 VITALS — DIASTOLIC BLOOD PRESSURE: 54 MMHG | SYSTOLIC BLOOD PRESSURE: 118 MMHG

## 2018-06-04 VITALS — DIASTOLIC BLOOD PRESSURE: 64 MMHG | SYSTOLIC BLOOD PRESSURE: 120 MMHG

## 2018-06-04 VITALS — SYSTOLIC BLOOD PRESSURE: 109 MMHG | DIASTOLIC BLOOD PRESSURE: 55 MMHG

## 2018-06-04 VITALS — DIASTOLIC BLOOD PRESSURE: 47 MMHG | SYSTOLIC BLOOD PRESSURE: 102 MMHG

## 2018-06-04 VITALS — SYSTOLIC BLOOD PRESSURE: 151 MMHG | DIASTOLIC BLOOD PRESSURE: 58 MMHG

## 2018-06-04 LAB
BASOPHILS # BLD AUTO: 0.2 /CMM (ref 0–0.2)
BASOPHILS NFR BLD AUTO: 1.6 % (ref 0–2)
BUN SERPL-MCNC: 45 MG/DL (ref 7–18)
CALCIUM SERPL-MCNC: 8.5 MG/DL (ref 8.5–10.1)
CHLORIDE SERPL-SCNC: 99 MMOL/L (ref 98–107)
CO2 SERPL-SCNC: 39 MMOL/L (ref 21–32)
CREAT SERPL-MCNC: 1.3 MG/DL (ref 0.6–1.3)
EOSINOPHIL NFR BLD AUTO: 1.8 % (ref 0–6)
GLUCOSE SERPL-MCNC: 177 MG/DL (ref 74–106)
HCT VFR BLD AUTO: 29 % (ref 33–45)
HGB BLD-MCNC: 9.2 G/DL (ref 11.5–14.8)
LYMPHOCYTES NFR BLD AUTO: 1.8 /CMM (ref 0.8–4.8)
LYMPHOCYTES NFR BLD AUTO: 18 % (ref 20–44)
MCHC RBC AUTO-ENTMCNC: 32 G/DL (ref 31–36)
MCV RBC AUTO: 90 FL (ref 82–100)
MONOCYTES NFR BLD AUTO: 1.1 /CMM (ref 0.1–1.3)
MONOCYTES NFR BLD AUTO: 10.6 % (ref 2–12)
NEUTROPHILS # BLD AUTO: 6.8 /CMM (ref 1.8–8.9)
NEUTROPHILS NFR BLD AUTO: 68 % (ref 43–81)
PLATELET # BLD AUTO: 219 /CMM (ref 150–450)
POTASSIUM SERPL-SCNC: 4 MMOL/L (ref 3.5–5.1)
RBC # BLD AUTO: 3.22 MIL/UL (ref 4–5.2)
RDW COEFFICIENT OF VARIATION: 21.4 (ref 11.5–15)
SODIUM SERPL-SCNC: 141 MMOL/L (ref 136–145)
WBC NRBC COR # BLD AUTO: 10 K/UL (ref 4.3–11)

## 2018-06-04 RX ADMIN — Medication SCH GM: at 08:10

## 2018-06-04 RX ADMIN — Medication SCH GM: at 17:00

## 2018-06-04 RX ADMIN — ALBUTEROL SULFATE SCH MG: 1.25 SOLUTION RESPIRATORY (INHALATION) at 11:31

## 2018-06-04 RX ADMIN — Medication SCH EACH: at 18:06

## 2018-06-04 RX ADMIN — NITROFURANTOIN MONOHYDRATE AND NITROFURANTOIN, MACROCRYSTALLINE SCH MG: 75; 25 CAPSULE ORAL at 08:07

## 2018-06-04 RX ADMIN — LEVOTHYROXINE SODIUM SCH MCG: 100 TABLET ORAL at 08:07

## 2018-06-04 RX ADMIN — Medication SCH ML: at 17:00

## 2018-06-04 RX ADMIN — LOSARTAN POTASSIUM SCH MG: 50 TABLET, FILM COATED ORAL at 08:08

## 2018-06-04 RX ADMIN — ALBUTEROL SULFATE SCH MG: 1.25 SOLUTION RESPIRATORY (INHALATION) at 15:24

## 2018-06-04 RX ADMIN — Medication SCH MG: at 02:55

## 2018-06-04 RX ADMIN — CLOPIDOGREL BISULFATE SCH MG: 75 TABLET, FILM COATED ORAL at 08:08

## 2018-06-04 RX ADMIN — Medication SCH EACH: at 08:08

## 2018-06-04 RX ADMIN — ALBUTEROL SULFATE SCH MG: 1.25 SOLUTION RESPIRATORY (INHALATION) at 07:15

## 2018-06-04 RX ADMIN — OXYCODONE HYDROCHLORIDE AND ACETAMINOPHEN SCH MG: 500 TABLET ORAL at 08:08

## 2018-06-04 RX ADMIN — Medication SCH MG: at 15:24

## 2018-06-04 RX ADMIN — Medication SCH ML: at 08:10

## 2018-06-04 RX ADMIN — Medication SCH MG: at 08:08

## 2018-06-04 RX ADMIN — Medication SCH EACH: at 08:07

## 2018-06-04 RX ADMIN — THERA TABS SCH UDTAB: TAB at 08:08

## 2018-06-04 RX ADMIN — Medication SCH MG: at 08:09

## 2018-06-04 RX ADMIN — MUPIROCIN SCH APPLIC: 20 OINTMENT TOPICAL at 08:10

## 2018-06-04 RX ADMIN — Medication SCH MG: at 11:31

## 2018-06-04 RX ADMIN — Medication SCH MG: at 07:15

## 2018-06-04 RX ADMIN — METOPROLOL TARTRATE SCH MG: 25 TABLET, FILM COATED ORAL at 17:00

## 2018-06-04 RX ADMIN — Medication SCH EACH: at 17:00

## 2018-06-04 RX ADMIN — FOLIC ACID SCH MG: 1 TABLET ORAL at 08:08

## 2018-06-04 RX ADMIN — METOPROLOL TARTRATE SCH MG: 25 TABLET, FILM COATED ORAL at 08:08

## 2018-06-04 RX ADMIN — ACETAMINOPHEN PRN MG: 325 TABLET ORAL at 08:07

## 2018-06-04 RX ADMIN — ALBUTEROL SULFATE SCH MG: 1.25 SOLUTION RESPIRATORY (INHALATION) at 02:55

## 2018-06-04 RX ADMIN — Medication SCH EACH: at 12:23

## 2018-06-04 RX ADMIN — INSULIN HUMAN PRN UNIT: 100 INJECTION, SOLUTION PARENTERAL at 12:41

## 2018-06-04 RX ADMIN — HUMAN INSULIN PRN UNIT: 100 INJECTION, SOLUTION SUBCUTANEOUS at 08:22

## 2023-09-13 NOTE — NUR
MS RN AM NOTES



RECEIVED PT ASLEEP IN BED, PT A/OX 3, LETHARGIC BUT AROUSEABLE.  ON 2L O2NO ACUTE CHANGE OF 
CONDITION, NO SOB. ON 2L O2 VIA N/C SATURATING @ 95%, LUNG SOUNDS DIMINISHED. DENIES 
PAIN/DISCOMFORT, LEFT WRIST IV SITE FLUSHED, SITE CLEAR. DENT CATHETER INTACT WITH YELLOW 
URINE OUTPUT.  NO S/S OF HYPER OR HYPOGLYCEMIA, SEE NURSING FLOWSHEET FOR SKIN ISSUES. 
CARDIAC MECH SOFT DIET. SCHEDULED AM MEDS TO BE GIVEN.  CL WITHIN REACHED AND SAFETY 
MAINTAINED.  WILL CONTINUE TO MONITOR. Class II - visualization of the soft palate, fauces, and uvula

## 2025-02-06 NOTE — NUR
:

PT BLOOD SUGAR . CONTACTED ON CALL [facility] REGARDING PT'S BLOOD SUGAR, BECAUSE THERE IS 
NO SLIDING SCALE COVERAGE FOR THE PT. AWAITING FOR CALL BACK
ADMISSION NOTES:

RECEIVED REPORT FROM ARMANDO SPICER, PT BROUGHT TO THE UNIT VIA DES, PT FROM [facility] AND CAME FOR 
INCREASING CONFUSION UTI AND CHF. PT BEING ADMITTED FOR UTI AND CHF. PT ON 2L NC RESPIRATION 
EVEN AND UNLABORED. DENIES ANY PAIN AT THIS TIME. 95% ON OXYGEN. LEFT WRIST G 18 PATENT AND 
FLUSHING WELL, ON HL. SKIN ASSESSMENT PERFORMED, BED BATH PROVIDED TO THE PT, CHANGED 
DIAPER, SKIN ISSUES TAKEN AND PHOTO ATTACHED TO CHART. PT HAS LEFT CW PACEMAKER, PLACED ON 
TELE MONITORING CURRENTLY V PACING HR 82. NOTED BLE EDEMA +3 PITTING. BLE OFFLOADED. PLACED 
ON FE MATTRESS. SAFETY PRECAUTIONS FOR FALL INITIATED, CALL LIGHT IN REACH, INVENTORY OF 
BELONGING COMPLETED. WILL CONTINUE MONITORING PT.
CALLED PATIENT  EMAD SON  (DPOA) AND UPDATED ON TRANSFER TO ICU FOR BIPAP.
CXR DONE.
[facility] RN CLOSING NOTE



PT REMAINED STABLE DURING SHIFT. NO ACUTE DISTRESS NOTED. ALL NEEDS ATTENDED TO PROMPTLY. 
REPOSITIONED Q2H. O2 SATURATION MAINTAINED ABOVE 92%. CALL LIGHT WITHIN REACH. WILL ENDORSE 
TO NEXT SHIFT FOR CONTINUITY OF CARE.
[facility]AR[facility] RN INITIAL NOTE



PT RECEIVED AWAKE AND ALERT IN BED. ON 3L OF O2 VIA NC AND SATURATING WELL. BREATHING 
REGULAR AND UNLABORED. HOB ELEVATED. NO DISCOMFORT NOTED. TELE- V-PACING 100%. IV MORA 
MIDLINE CLEAN AND DRY. DENT CATHETER IN PLACE AND DRAINING BY GRAVITY. ISOLATION 
PRECAUTIONS OBSERVED. CALL LIGHT WITHIN REACH. WILL CONTINUE TO MONITOR.
[facility]AR[facility] RN NOTE



PT REFUSED SKIN ASSESSMENT PICTURES. EXPLAINED REASONS FOR PICTURES IS FOR DOCUMENTATION. PT 
REFUSED.
[facility] RN NOTE

PER TYLER IN RADIOLOGY PATIENT WILL NOT HAVE THORACENTESIS TODAY NO RADIOLOGIST. PATIENT 
NEEDS TO BE REMOVED OFF OF BLOOD THINNER FOR 48-72 HOURS BEFORE THORACENTESIS. WILL REPORT 
TO DR. RUGGIERO.
[facility] RN NOTE

REPORT RECEIVED FROM BELLO SPICER FOR BRIDGETT. PATIENT MEDICALLY STABLE NC 3L . TELE V PACING ALL 
SAFETY MEASURES IN PLACE NO C/O OF SOB AND NO PAIN REPORTED. WILL CONTINUE TO MONITOR 
CLOSELY. CALL FROM LAB CARBON DIOXIDE 42 SAME RESULT AS YESTERDAY MD AWARE. PT A/O X2-3.
[facility] RN NOTE

SPOKE TO DR. LAM ORDERED PATIENT TO BE SENT TO ICU AFTER ABG RESULTS. CALLED AND SPOKE TO 
LISA SPICER ICU. DR. LAM AWARE OF THE THORACENTESIS NOT BEING PERFORMED TODAY AND RADIOLOGY 
WANTING DC OF LOVENOX. DR LAM STATED " WILL TAKE CARE OF IT".  REPORTED TRANSFER TO DR. RUGGIERO. PATIENT TRANSFERRED TO ICU VIA GURNEY. WILL BEGIN ON BIPAP.
[facility]ARIA RN NOTE

SPOKE TO DR. RUGGIERO REGARDING DC OF LOVENOX . ORDERED TO CALL DR. MILES.  SAID NOT TO DC 
LOVENOX AND LET DR. LAM KNOW. I ALS REPORTED PATIENT HAS BEEN LETHARGIC THROUGHOUT 
MORNING. ORDERED STAT ABG. CALLED RT.
[facility]ARIA RN NOTES



NO ACUTE CHANGES NOTED DURING MY SHIFT, PT IS STABLE, NO LABORED BREATHING, NO SOB AT THIS 
TIME. PATIENT CARE WILL BE ENFORCED TO AM NURSE.
[facility]ARIA RN NOTES



RECEIVED BEDSIDE REPORT. PATIENT IN BED, A/O X2-3 WITH CONFUSION EPISODES , ON NASAL CANNULA 
2L SATURATING 100% O2 SAT, ON TELE MONITORING V PACING 80 SR. NO SOB, NO LABORED BREATHING 
NOTED AT THIS TIME. DIAPER AND FC IN PLACE TO GRAVITY DRAINING YELLOW URINE, IV MORA MIDLINE, 
CLEAN, INTACT AND PATENT, BED IN LOW AND LOCKED POSITION CALL LIGHT WITHIN REACH, WILL 
CONTINUE TO MONITOR.
ER MD AT BEDSIDE TO EVAL PT WITH ORDERS RECEIVED. WILL CARRY OUT ORDERS.
ICU RN CLOSING NOTE



NO SIGNIFICANT CHANGES OVERNIGHT.  PATIENT MORE AWAKE AND AWARE, FOLLOWS COMMANDS.  
TOLERATING BIPAP SETTINGS.  NO DISTRESS NOTED.  KEPT CLEAN AND AND DRY.  ALL DUE MEDS GIVEN. 
 TURNED AND REPOSITIONED Q2 AND PRN.  SIDE RAILS UP AND LOCKED.  BED KEPT AT LOWEST 
POSITION.  CALL LIGHT KEPT WITHIN EASY REACH.  ISOLATION PRECAUTIONS OBSERVED.  HOB 
ELEVATED.  CONTINUITY OF CARE ENDORSED TO AM NURSE.
ICU RN INITIAL NOTES



RECEIVED PATIENT LETHARGIC, RESPONSIVE TO PAIN.  NO RESPIRATORY DISTRESS NOTED, ON BIPAP 
24/5, RATE 24, FIO2 40% SPO2 100%.  ON TELE MONITOR VPACING.  SKIN WARM AND DRY TO TOUCH.  
WITH F/C PATENT AND INTACT, DRAINING BY GRAVITY.  WITH MORA MIDLINE PATENT AND INTACT TKO.  
HOB ELEVATED.  SIDE RAILS UP AND LOCKED.  BED KEPT AT LOWEST POSITION, ISOLATION PRECAUTIONS 
OBSERVED.  WILL CONTINUE TO MONITOR.
ICU RN NOTE



1445: Patient transferred from  [facility] to ICU for Bipap. Patient is awake, A/Ox2. Placed on 
Bipap by RT, 20/6 rate 15 40% FIO2. With MORA midline intact. With Cui intact, noted with 
clear harris colored urine drained to BSD. VSS at this time. Will continue to monitor. 
Contact isolation precaution for MRSA nares maintained and observed. V pacing 100%, rate of 
80 on the monitor.



1530: No any significant changes noted. On Bipap, tolerated. Patient seems comfortably 
resting at this time. VSS.
ICU RN NOTE



ABG result relayed to Dr. Joselyn MD ordered to do another ABG @ 2000. Patient is lethargic. 
VSS.
ICU RN NOTE



DR. RUGGIERO IN UNIT AWARE OF PT'S HGB TRENDING DOWN AND INFORMED OF LOVENOX BEING HELD. 
AGREED WITH INTERVENTION. WILL CONTINUE TO MONITOR.
ICU RN NOTE



DR. RUGGIERO INFORMED OF LACTIC ACID TRENDING UP 2.7  LATEST. RECOMMENDED 1L NS BOLUS. PER DR. LAM HOLD ON IVF AND CONTINUE WITH LASIX. BOTH DR. RUGGIERO AND DR. LAM DISCUSSED PT'S 
CONDITION AND ARE IN AGREEMENT WITH HOLDING OF IVF FOR NOW.
ICU RN NOTE



PT EXHIBITS ANXIOUS BEHAVIOR WANTS RN TO BE AT BEDSIDE ALL DAY. PT C/O SOB, OBSERVED WITH 
SHALLOW BREATHING AND TACHYPNEIC. PT COACHED TO TAKE DEEPER, SLOWER BREATHS, TOUCH USED. PT 
CALMED DOWN. WILL CONTINUE TO MONITOR.
ICU RN NOTE



PT OFF BIPAP, ON 2L VIA NC TOLERATING WELL. PT ANXIOUS AT TIMES WITH VITAL SIGNS STABLE. PT 
COMFORTED AND RE-ASSURED ABOUT BREATHING. PT ABLE TO CALM DOWN. WILL CONTINUE TO MONITOR.



LOVENOX HELD DUE TO HGB TRENDING DOWN.
ICU RN NOTE



PT REMAINS ON BIPAP TOLERATING WELL. LESS LETHARGIC THAN THIS MORNING. V-PACING ON TELE. 
DENT TO GRAVITY DRAINING CLOUDY, YELLOW URINE. IV SITES C/D/I/PATENT. NO S/O 
INFILTRATION/PHLEBITIS OBSERVED UPON FLUSHING. PT'S CARE WILL BE ENDORSED TO NIGHT SHIFT RN 
FOR CONTINUITY OF CARE. BED IN LOW AND LOCKED POSITION.
ICU RN NOTE



PT REMAINS STABLE, TOLERATING O2 VIA NC, V-PACING ON TELE. IV SITES C/D/I/PATENT. NO S/O 
INFILTRATION/PHLEBITIS OBSERVED UPON FLUSHING. PT'S CARE ENDORSED TO NIGHT SHIFT RN FOR 
CONTINUITY OF CARE. BED IN LOW AND LOCKED POSITION. CALL LIGHT WITHIN REACH.
ICU RN NOTE



Patient is on Bipap. Patient is lethargic, will hold dinner and injsulin dose. . Held 
pm med for now for risk of aspiration. Will continue to monitor. VSS.
ICU RN NOTE



RELAYED ABG RESULT TO DR. LAM WITH ORDERS FOR ABG IN MORNING.  NOTED WILL CONTINUE TO 
MONITOR.
ICU RN NOTE



RELAYED TO DR. LAM DAUGHTER RONA WANTS TO TALK TO HIM, DR. LAM SPOKE WITH DAUGHTER ON 
THE PHONE.
ICU RN- INITIAL NOTE

RECEIVED PT RESTING IN BED. CURRENTLY ON BIPAP, RESPIRATIONS EVEN AND UNLABORED, NO SOB OR 
DISTRESS PRESENT. BEDSIDE MONITOR REVEALS V-PACING. DENT CATHETER PRESENT AND DRAINING TO 
GRAVITY. MORA MIDLINE RUNNING NS @ TKO. WILL CONTINUE TO MONITOR.
ICU RN- INITIAL NOTE

RECEIVED PT RESTING IN BED. PT A/O X3, ABLE TO FOLLOW COMMANDS & MAKE NEEDS KNOWN. CURRENTLY 
ON 2L NC, RESPIRATIONS EVEN AND UNLABORED, NO SOB OR DISTRESS PRESENT. BEDSIDE MONITOR 
REVEALS V-PACING. DENT CATHETER PRESENT AND DRAINING TO GRAVITY. MORA MIDLINE RUNNING NS @ 
TKO. WILL CONTINUE TO MONITOR.
ICU RN- RADIOLOGIST CALLED AND STATED PT HAS DEVELOPED A SMALL PNEUMOTHORAX ON THE RIGHT 
SIDE POST THORACENTESIS. INFORMED DR. LAM AND OBTAINED ORDER TO REPEAT CHEST XRAY IN 2 
HOURS. MD AWARE PT REMAINS ON 3L NC AND NO SOB OR DISTRESS PRESENT. ORDER PLACED. WILL 
CONTINUE TO MONITOR.
ICU RN- REPORT GIVEN TO JORDAN SPICER. PT TO TRANSFER TO [facility], ROOM 106.

1045- PT TRANSFERRED TO ROOM 106. ALL BELONGINGS SENT WITH PT. PT'S DAUGHTER AWARE OF 
TRANSFER.
ICU RN- RIGHT SIDED THORACENTESIS COMPLETED AT BEDSIDE. 900 ML OF FLUID REMOVED. PROCEDURE 
HAD TO BE STOPPED DUE TO PT C/O CHEST PAIN TOWARDS END OF PROCEDURE. SPECIMEN SENT TO LAB 
FOR CYTOLOGY. WILL CONTINUE TO MONITOR.
ICU/LVN

AM LABS DONE, WAIT ANY ABNORMAL LAB VALUES.
ICU/LVN

BLOOD SUGAR IS 83, WILL CONTINUE TO MONITOR PT'S BLOOD SUGAR AS ORDERED BY MD. PT WAS TURNED 
AND REPOSITIONED OR COMFORT AND CARE
ICU/LVN

BLOOD SUGAR IS 90, WILL CONTINUE TO MONITOR PT'S BLOOD SUGAR AS ORDERED BY MD. PT WAS TURNED 
AND REPOSITIONED OR COMFORT AND CARE
ICU/LVN

CAME BACK FROM LUNCH WITH PT CALLING AND SCREAMING, PT SLIDING DOWN THE BED SAID SHE WAS "IN 
PAIN." PT WAS THEN GIVEN A BATH. AFTER BATH SAID SHE WAS HUNGRY AND "THAT NOBODY HAD FEED 
ME." REMINDED PT THAT AFTER ACCU CHECK WAS DONE WITH A BS  AND COVERED WITH 4 UNITS 
REGULAR INSULIN WAS GIVEN 2 JELLO. PT SAID SHE IS STILL HUNGRY, GAVE 1 APPLESAUCE AND GRAM 
CRACKERS. PT THEN SAID WE WERE "STARVING" HER. PT WAS REPOSITIONED TO THE OPPOSITE SIDE FROM 
WHICH SHE WAS FOUND AFTER BATH. PT THEN BEGAN TO YELL SAYING THAT "WE'RE TRYING TO KILL" HER 
AND THAT SHE NEEDS TO GO ON THE OPPOSITE SIDE. PT EXPLAINED THAT SHE NEEDS TO SHIFT FROM 
SIDE TO SIDE TO PROMOTE CIRCULATION TO THE BODY. PT REFUSED, DIFFICULT TO REASON WITH. CALL 
LIGHT WITHIN REACH.
ICU/LVN

PT GIVEN AM CARE ALONG WITH ORAL CARE, PT TOLERATED THIS WELL REMAINS ON CURRENT 02 2 LITERS 
VIA NC, SATURATION IS 98%. PT TURNED AND REPOSITIONED FOR COMFORT AND CARE.
ICU/LVN

PT GIVEN AM CARE ALONG WITH ORAL CARE, PT TOLERATED THIS WELL. REMAINS ON CURRENT BIPAP 
SETTINGS. SATURATION IS 95-98%. PT WAS TURNED AND REPOSITIONED FOR COMFORT AND CARE.
ICU/LVN

PT'S BLOOD SUGAR , PT WAS COVERED FOR THIS PER MD ORDERS. WILL CONTINUE TO MONITOR  
BLOOD SUGAR  AS PROTOCOL AND MD ORDERERS.
ICU/LVN

PT'S BLOOD SUGAR IS CRITICAL LOW AT 43, NOTIFIED CHARGE NURSE ALSO DEXTROSE IV PUSH WAS 
GIVEN FOR THIS WILL RECHECK BLOOD SUGAR AGAIN IN 15 MINUTES.
ICU/LVN

PT'S BLOOD SUGAR WAS RECHECKED AFTER THE DEXTROSE GIVEN IVP FOR THE BS OF 43, NEW BLOOD 
SUGAR IS . NO COVERAGE FOR THIS BLOOD SUGAR  HOWEVER WILL RECHECK AGAIN AT 0100.
ICU/LVN

RECEIVED REPORT FROM DAY NURSE, PT APPEARS TO BE ALERT X 2. PT IS ON 2 LITERS N/C SATURATION 
IS 97%, NO DISTRESS SEEN AT THIS TIME, PT APPEARS COMFORTABLE.  PT FEEDS SELF WITH LITTLE 
HELP. PT HAS F/C, DRAINING YELLOW URINE. PT HAS A FEW SKIN ISSUES THAT ARE ADDRESSED ON FLOW 
SHEET. RIGHT UPPER ARM PICC LINE. PT WAS TURNED AND REPOSITIONED FOR COMFORT AND CARE. WILL 
CONTINUE TO MONITOR THIS PT.
ICU/LVN

RECEIVED REPORT FROM DAY NURSE, PT APPEARS TO BE ALERT X 4. PT IS ON 2 LITERS N/C SATURATION 
IS 95%, NO DISTRESS SEEN AT THIS TIME, PT APPEARS COMFORTABLE.  PT FEEDS SELF WITH LITTLE 
HELP. PT HAS F/C, DRAINING YELLOW URINE. PT HAS A FEW SKIN ISSUES THAT ARE ADDRESSED ON FLOW 
SHEET. RIGHT UPPER ARM PICC LINE. PT WAS TURNED AND REPOSITIONED FOR COMFORT AND CARE. WILL 
CONTINUE TO MONITOR THIS PT. PT IS S/P THORACENTESES TODAY, NO ACUTE DISTRESS SEEN, AND NO 
SOB SEEN.
ICU/LVN

REPORT GIVEN TO DARIA RN , PT AT THIS TIME STABLE. NO ACUTE DISTRESS SEEN. PT APPEARS 
COMFORTABLE.
ICU/LVN

WENT IN TO DO MIDNIGHT TEMPERATURE, HOWEVER WHEN IT CAME TO REPOSITIONING , PT REFUSED SAID 
NO. PT IS S/P THORACENTESES AND IS FAVORING THE OPPOSITE SIDE OF THIS PROCEDURE.
ICU/RN

PT TRANSFERRED FROM TELE UNIT.WITH SOB, ALOC ,RESPONSIVE ON PAIN STIMULATION.PLACED ON 
BI-PAP.FIO2-100%.V/S STABLE,AFEBRILE.NO PAIN REPORTED AT THIS TIME .PT IS NPO.F/C PLACED AS 
ORDERED.WOUND ON THE LOWER BACK NOTED.MEPILEX APPLIED.[facility] ORDERED.REDNESS ON WILDA 
AREA AND UNDER BILATERAL BREASTS NOTED. LEFT CHEST PACEMAKER NOTED.PT IS 
V-PACING.GENERALIZED EDEMA PRESENT.CONTINUE MONITORING.
INITIAL ICU RN NOTE



RCVD PT ON BIPAP TOLERATING WELL, ABLE TO OPEN EYES TO PAINFUL STIMULI, V-PACING ON TELE. 
DENT TO GRAVITY DRAINING CLOUDY, YELLOW URINE. LEFT WRIST IV SITE C/D/I/PATENT. NO S/O 
INFILTRATION/PHLEBITIS OBSERVED UPON FLUSHING. WILL CONTINUE TO MONITOR PT FOR SAFETY AND 
COMFORT. CALL LIGHT WITHIN REACH. BED IN LOW AND LOCKED POSITION.
INITIAL ICU RN NOTE



RCVD PT SLEEPING, EASILY AROUSED BY TOUCH/NAME. PT ALERT AND ORIENTED, ABLE TO FOLLOW 
COMMANDS. V-PACING ON TELE. TOLERATING O2 VIA NC. DENT TO GRAVITY DRAINING CLOUDY, YELLOW 
URINE. IV SITES C/D/I/PATENT. NO S/O INFILTRATION/PHLEBITIS OBSERVED UPON FLUSHING. WILL 
CONTINUE TO MONITOR PT FOR SAFETY AND COMFORT. CALL LIGHT WITHIN REACH. BED IN LOW AND 
LOCKED POSITION.
INITIAL ICU RN NOTE



RCVD PT SLEEPING,EASILY AROUSED TO NAME/LIGHT TOUCH. V-PACING ON TELE. ON BIPAP TOLERATING 
WELL. PT ALERT AND ABLE TO FOLLOW COMMANDS. DENT TO GRAVITY DRAINING CLOUDY, YELLOW URINE. 
IV SITES C/D/I/PATENT. NO S/O INFILTRATION/PHLEBITIS OBSERVED UPON FLUSHING. WILL CONTINUE 
TO MONITOR PT FOR SAFETY AND COMFORT. CALL LIGHT WITHIN REACH. BED IN LOW AND LOCKED 
POSITION.
MEDICATION NOTE



PT'S PO MEDS HELD DUE TO RISK OF ASPIRATION SECONDARY TO BIPAP. WILL CONTINUE TO MONITOR.
PATIENT RECEIVED ON BIPAP WITH SETTINGS OF 24/5, BUR 24, 40%. REMOVED PATIENT OFF BIPAP WITH 
ABG ORDER @ 32%. PLACED PATIENT BACK ON BIPAP WITH NEW SETTINGS OF 15/5, BUR 24, 40%. 
MEPILEX PLACED ON PATIENT'S NOSE. MASK FITS TIGHTS AND SECURED. ALARMS ON AND AUDIBLE. 
PATIENT IS AWAKE/ALERT T/O SHIFT. NO SOB NOTED. MONITORED CLOSELY.

-------------------------------------------------------------------------------

Addendum: 05/29/18 at 1649 by KENJI BOATENG RT

-------------------------------------------------------------------------------

Amended: Links added.
PT ON BIPAP 15/5, 24, 40%. PT IS AWAKE ALERT NO RESP DISTRESS. PLACED PT ON UTN MASK. 
NOTICED REDNESS ON THE BRIDGE OF THE NOSE. RN AWARE. BIPAP ALARMS SET AND AUDIBLE. AMBU BAG 
AT BEDSIDE. BIPAP PLUGGED INTO RED OUTLET. 

-------------------------------------------------------------------------------

Addendum: 05/29/18 at 2058 by ANUSHKA GODOY RT

-------------------------------------------------------------------------------

Amended: Links added.
PT PLACED OFF OF BIPAP ONTO 2LPM  NASAL O2. ZERO DISTRESS NOTED. AT THIS TIME
PT PRESENTS OPEN CUT ON BRIDGE OF NOSE. MEPILEX APPLIED. RN LIDA AWARE

-------------------------------------------------------------------------------

Addendum: 05/27/18 at 0208 by CLARK TOUSSAINT RT

-------------------------------------------------------------------------------

Amended: Links added.
PT RCVD ON 2L NC. PT PLACED  ON BIPAP 15/5, 24, 40%. RN NOTIFIED.  PT IS AWAKE AND ALERT,  
NO RESP DISTRESS.  NOTICED REDNESS AND WOUND  ON THE BRIDGE OF THE NOSE. RN AWARE. BIPAP 
ALARMS SET AND AUDIBLE. AMBU BAG AT BEDSIDE. BIPAP PLUGGED INTO RED OUTLET.  BREATHING TX 
GIVEN PER MD'S ORDERED NO ADVERSE REACTION NOTED.  WILL CONTINUE TO MONITOR.

--------------------------------------------------------------------------
PT RCVD ON BIPAP 24/5, RATE 24, 40%.  BREATHING TX GIVEN PER MD'S ORDERED, NO ADVERSE 
REACTION NOTED.  NO RESPIRATORY DISTRESS  NOTED AT THIS TIME. WILL CONTINUE TO MONITOR
PT'S DAUGHTER, RONA, UPDATED ON PT'S STATUS THROUGHOUT THE DAY.
REPORT CALLED TO TELE NAYAN MENDOZA. WILL TRANSPORT PT VIA ACLS PROTOCOL.
RN CLOSING NOTES:

PT IN BED, REMAINS A/O X2-3 ON 2L VIA NC RESPIRATION EVEN AND UNLABORED. SPO2 94%. IV ACCESS 
REMAINS

PATENT AND FLUSHING WELL, ON HL. BLE OFFLOADED. REMAINS V-PACING HR 80. DENIES ANY PAIN OR 
DISCOMFORT

AT THIS TIME. NO CALLBACK RECEIVE FROM [facility] ON CALL MD, RN CHARGE AWARE, WILL RELAY TO DAY 
RN TO FOLLOW UP. SAFETY PRECAUTIONS FOR FALL REMAINS ENGAGED, CALL LIGHT IN REACH. 

WILL ENDORSE TO DAY RN FOR BRIDGETT.
RN ICU



PATIENT STARTED EATING

IN ACCURATE RESULT WILL SHOW

WILL CONFIRM WITH DOCTOR IF ACHS ACCUCHECK WILL BE DONE 

SINCE BEFORE STARTING FEEDING, HER  BS SUGAR  MG/DL
RN ICU



RECEIVED PATIENT AWAKE ON BIPAP

ALERT AWAKE ORIENTED X 2 WITH EPISODES OF CONFUSION

SINUS RHYTHM WITH V PACING ON THE MONITOR

ABLE TO CONSUME HER SHARE ON MEAL WITH GOOD APPETITE 

WITH DENT CATHETER DRAINING TO YELLOWISH URINE ADEQUATE IN AMOUNT
RN NOTE

MG 1.7, K 3.0, CO2 CHEMISTRY 42, NOTIFIED DR CASTELLANOS, ORDERS FROM DR YEE AND CARRIED 
OUT
RN NOTE

PATIENT IS ALERT/ORIENTED X 2, REQUESTED TO TAKE OFF BI-PAP, EXPLAINED ALL RISKS AND 
BENEFITS, PATIENT VERBALIZED UNDERSTANDING BUT STILL REFUSED
RN NOTE

PT DISCHARGED BACK TO [facility] IN STABLE CONDITION, IV REMOVED, PT HAS NO DENT, 
DISCHARGE INSTRUCTIONS GIVEN TO PATIENT, EXIT CARE DONE, PICTURES TAKEN, TEACHING DONE TO 
PT, PT VERBALIZED UNDERSTANDING, BELONGINGS LIST SIGNED BY THE PT, BELONGINGS GIVEN TO PT. 
REPRT GIVEN TO NAYAN HUNT AT THE [facility].
RN NOTE

RECEIVED PT ON BED, SLEEPY, ON NC 2L/MIN, ON TELE MONITOR V PACING 83 BPM, IV IN PLACE, 
DENT IN PLACE, URINE YELLOW CLEAR, PER REPORT PT REMAINE ON BIPAP FOR 8HR DURING THE NIGHT. 
CALL LIGHT WITHIN REACH,  WILL MONITOR.
RN NOTE: 



PATIENT REMAINS ALERT AWAKE ORIENTED X4. ON OXYGEN THERAPY VIA NC. DENIES PAIN/DISCOMFORT. 
SAFETY MEASURES OBSERVED. MIDLINE INTACT, DRESSING DRY & CLEAN. CALL LIGHT WITHIN REACH. 
DENT CATH INTACT, DRAINING WELL WITH GRAVITY. CALL LIGHT WITHIN REACH. WILL ENDORSE TO PM 
SHIFT RN FOR CONTINUITY OF CARE.

-[facility] EVALUATION  MADE AWARE BY SHIFT CHARGE RN.
RN NOTES



IN BED SLEEPING COMFORTABLY WITH NO RESPIRATORY DISTRESS OR SHORTNESS OF BREATH. BREATHING 
EVEN AND UNLABORED. NO PHYSICAL MANIFESTATION OF PAIN OR DISCOMFORT. DENT CATH IN PLACE AND 
INTACT DRAINING CLEAR YELLOW WITH NO FOUL ODOR URINE. KEPT CLEAN AND DRY. WILL CONTINUE TO 
MONITOR.
RN NOTES



NO SIGNIFICANT CHANGE OF CONDITION, ALERT AND ORIENTED WITH EPISODES OF FORGETFULNESS. NO 
COMPLAINT OF PAIN OR DISCOMFORT. NO DISTRESS NOTED. WILL ENDORSE TO AM SHIFT FOR CONTINUITY 
OF CARE.
RN NOTES:



Pt admitted in room 104, DARIA status, transferred via bed accompanied by 2 RNs. Report given 
by Madison at bedside. Pt is A/O x 1-2, not in any distress. On NC/3lpm, sating at 98%. Placed 
on telemonitor, Vpacing w/ HR 80. Has MORA midline, SL, C/D/I, no s/sx of 
infection/infiltration noted. Has FC draining to BSB. Pt oriented to new room. Belongings at 
bedside. Pt wearing her dentures. Pt was able to eat her dinner w/ David. Kept well rested. 
Bed kept low & in locked pos. Call light placed w/in reach. Will endorse to PM RN for BRIDGETT.
RN NOTES:

CONTACTED FAMILY IOF THE PT, SPOKED TO PT'S SON MITCH EDGAR [phone], INFORMED THAT PT 
WILL BE TRANSFERRED TO ICU FOR BIPAP MANAGEMENT, PER SON HE STATED HIS MOTHER ALWAYS NEEDS 
BIPAP EVEN BEFORE WHENEVER HE GOT ADMITTED TO THE HOSPITAL. HE STATED HE WILL DROP BY THIS 
AM, INFORMED OF PT'S ROOM NUMBER 259.
RN NOTES:

CONTACTED [facility] ON CALL REGARDING MEDICATION TO HELP EASE PT'S ANXIETY, AWAITING FOR CALL 
BACK
RN NOTES:

ON CALL EPIC MD MADE AWARE OF PT'S BREATHING AS SHE IS TACHYPNEIC 21, WITH SHALLOW 
BREATHING, INFORMED PT RECEIVED LASIX IV IN ER, UPDATED WITH LATEST VS, ON V-PACING HR 80, 
DENIES ANY PAIN, INFORMED THAT PT PLACED ON 2L WITH 92-94% OF OXYGEN SATURATION. NO FURTHER 
ORDERS RECEIVED FROM MD. RN CHARGE AWARE
RN NOTES:

PLACED SECOND CALL TO EPIC EXCHANGE, NAYAN PACE AWARE, AWAITING FOR CALL BACK
RN NOTES:

PT CALLED AGAIN STATED SHE DOESNT WANT HER FOOD, REQUESTED TO REMOVE HER FOOD IN THE TABLE.
RN NOTES:

PT CALLED AGAIN STATED SHE'S HUNGRY, PROVIDED WITH SNACKS.
RN NOTES:

PT CALLED AGAIN TO BE REPOSITIONED, REPOSITIONED PT AT THIS TIME WITH HELP OF ISMAEL GOLD PLACED ON ISOLATION FOR HISTORY OF ESBL IN URINE. URINE WAS COLLECTED AND SENT TO LAB 
FOR CULTURE. AWAITING RESULT.
RN NOTES:

PT CALLED AGAIN, REPOSITIONED AT THIS TIME.
RN NOTES:

PT WAS PROVIDED WITH SNACKS, EGG SAND WHICH, JELL O, CRANBERRY JUICE AND WATER ONLY 600ML 
FOR NIGHT SHIFT. PT STATED SHE DOESNT WANT TO EAT ONLY JELLO AND JUICE.
RN NOTES:

RECEIVED CALL FROM EPIC EXCHANGE ASKING IF MD CALL BACK, INFORM EXCHANGE THAT MD DIDNT CALL 
BACK, EXCHANGE SAID BECAUSE ITS ALREADY ALMOST CHANGE OF SHIFT, SO THEY WILL DISPATCH THE 
CALL TO [facility] ON CALL FOR THE DAY, WHO IS TAYLER SU NP, AWAITING FOR CALL BACK
RN NOTES:

RECEIVED CALL FROM TAYLER SU, INFORMED ABOUT THE PT'S CONDITION NEEDING BIPAP, WILL BE 
TRANSFER TO ICU. PER NP TAYLER HE WILL FOLLOW UP ON THE PT, AND WILL UPDATE HER MEDICATION 
IF NEEDS TO BE CHANGE TO IV INSTEAD OF PO. PT WILL BE GOING TO ROOM 259 PER RN DONNA SALES.
RT

PATIENT REMOVED FROM NOC BIPAP AND PLACED ON 2L N/C ANSHUL WELL. NAYAN VOSS AT BEDSIDE. NO SOB 
NOTED.
RT NOTE



ABG DRAWN @ 4473. REPORTED ABG RESULTS TO NAYAN NAYAK. PLACED PATIENT ON BIPAP WITH CURRENT 
SETTINGS. WILL FOLLOW UP WITH DR. LAM FOR UPCOMING ORDERS.
RT NOTE

PLACED PT ON BIPAP WITH NOTED SETTINGS PER MD ORDERS, DUE TO ABG RESULTS, PT WAS TAKEN TO 
ICU, AMBUBAG AT BEDSIDE VENT PLUGGED INTO RED OUTLET, WILL MONITOR CLOSELY,
TELE .2
TELE RN END NOTES

PATIENT RESTING IN BED, ALL NEEDS MET, PATIENT MORE AWAKE AND ALERT AT THIS TIME, WILL 
ENDORSE TO NIGHT SHIFT FOR CONTINUITY OF CARE.
TELE RN INITIAL NOTES

RECEIVED PATIENT IN BED SLEEPING, EASY TO AROUSE, ALERT WITH CONFUSION, ON NASAL CANNULA 3L 
SATURATING 95% O2 SAT, ON TELE MONITORING V PACING 81 SR,  DIAPER AND FC TO GRAVITY DRAINING 
YELLOW URINE, IV MORA MIDLINE, CLEAN AND PATENT, BED IN LOW AND LOCKED POSITION CALL LIGHT 
WITHIN REACH, WILL CONTINUE TO MONITOR.
TELE RN INITIAL NOTES

RECEIVED PATIENT IN BED, ALERT WITH CONFUSION, ON NASAL CANNULA 2L SATURATING 95% O2 SAT, ON 
TELE MONITORING V PACING 80 SR,  DIAPER AND FC TO GRAVITY DRAINING YELLOW URINE, IV MORA 
MIDLINE, CLEAN AND PATENT, BED IN LOW AND LOCKED POSITION CALL LIGHT WITHIN REACH, WILL 
CONTINUE TO MONITOR.
TELE RN NOTE

 ALL NEEDS ATTENDED.BACK TO BED
TELE RN NOTE

 CALLED TO PHARMACY FOR BUMEX S X3 STILL NOT DELIVERED WILL F\U
TELE RN NOTE

 UP ON CHAIR NOT IN ACUTE DISTRESS
TELE RN NOTE 

 RECEIVED PATIENT FROM ICU ALERT WITH CONFUSION ,ON R TELE MONITOR V PACING HR 80, ON 2L NA 
NO SOB NOTED BED IN LOWEST AND LOCKED POSITION , ON [employer] MATRES, WITH DENT CATH TO GRAVITY 
WITH YELLOW COLOR URINE, WILL CONT TO MONITOR CLOSELY ,NO SOB AT THIS TIME
TELE RN NOTE 

CONT ON BUMEX DRIP ,ALL NEEDS ATTENDED ,HAVING DINNER , ABLE TO EA SELF, WILL CONT TO 
MONITOR CLOSELY
TELE RN NOTES

REPORT GIVEN TO NAYAN SMALL FOR CONTINUITY OF CARE.
TO BED 10 BIB PRIVATE AMBULANCE FROM [facility] SENT BY 
PMD FOR ABNORMAL CXR, CHF AND UTI. PT AAOX4, PLACE PT ON CARDIAC MONITORING, 
CONTINUOUS POX. PT FAMILY MEMBERS AT BEDSIDE. PENDING ER MD DAILY.
TRANSFER RN NOTE



PT TRANSFERRED TO [facility] PER PROTOCOL, IN STABLE CONDITION, NO S/O DISTRESS OBSERVED OR 
MANIFESTED BY PT. V-PACING ON TELE. PT ALERT AND AWAKE. DENT TO GRAVITY. ALL BELONGINGS AND 
MEDICATIONS TRANSPORTED WITH PT. REPORT GIVEN AT BEDSIDE TO NAYAN DICKSON.
WOUND CARE CONSULT: PT PRESENTS WITH SCARRING AND INTACT DEEP TISSUE INJURY TO SACRAL AREA, 
PRESENT ON ADMISSION. PT ON BIPAP AT THIS TIME. ALL SKIN PROTECTION RECOMMENDATIONS 
DISCUSSED WITH NURSING STAFF. FIRST STEP MATTRESS ORDERED. WILL SEE TESSIE SHAY IN AGREEMENT 
WITH PLAN OF CARE. 

-------------------------------------------------------------------------------

Addendum: 05/25/18 at 0952 by ESTRELLITA VALERA WNDNU

-------------------------------------------------------------------------------

Amended: Links added.
pt placed on bipap per md order. alarms set and audible. nepilax applied to bridge of nose. 
justin. settings well at this time
tele lvn: notes



pt place on bipap per rn supervisor here and transferred pt icu with valuables via acls 
protocol accompanied by 2 r.t. and nurse. report given to viki (mirella) for continuity of 
care.
tele lvn: notes



received pt in bed lethargic, arousable, tachypneic=42, short of breath, and  vpacing=80. 
abg drawn per dr. valenzuela's order at this time. will continue to monitor.
tele lvn: notes



received verbal order from dr. valenzuela to transfer pt to icu with bipap. order read back and 
carried out. cn and rn supervisor made aware. per rn supervisor place pt on bipap here. avis. 
made aware.
tele rn notes 

 bipap  16/8  applied  by rt  tolerated by pts no sob no distress noted  will continue to 
monitor pts.
tele rn notes 

pts was off bipap  as requested by pts . no sob no distress noted pts  place back to nc at 3 
liters of o2  well tolerated by pts, no sob no distress noted ,will endorse to rn day shift  
for continuity of  care.
tele rn notes 

received pts in  bed awake and responsive , on tele v pacing on the  monitor , no sob no 
distress noted , all needs attended too call light within reach v/s stable afebrile  all due 
meds given as ordered , denies pain at this time  kept pts clean dry and comfortable . pts 
remain on 3 liters of o2 via nc  sating 99% will continue to monitor pts .
tle rn notes 

blood sugar  for 10pm is 149 mg/dl =2 units of regular insulin given per sliding scale .will 
check  bs again in am.pts on po diet.
No